# Patient Record
Sex: FEMALE | Race: WHITE | Employment: UNEMPLOYED | URBAN - METROPOLITAN AREA
[De-identification: names, ages, dates, MRNs, and addresses within clinical notes are randomized per-mention and may not be internally consistent; named-entity substitution may affect disease eponyms.]

---

## 2017-04-30 ENCOUNTER — HOSPITAL ENCOUNTER (INPATIENT)
Age: 27
LOS: 1 days | Discharge: HOME | DRG: 880 | End: 2017-05-01
Attending: EMERGENCY MEDICINE | Admitting: INTERNAL MEDICINE
Payer: SELF-PAY

## 2017-04-30 ENCOUNTER — APPOINTMENT (OUTPATIENT)
Dept: GENERAL RADIOLOGY | Age: 27
DRG: 880 | End: 2017-04-30
Attending: EMERGENCY MEDICINE
Payer: SELF-PAY

## 2017-04-30 ENCOUNTER — APPOINTMENT (OUTPATIENT)
Dept: CT IMAGING | Age: 27
DRG: 880 | End: 2017-04-30
Attending: EMERGENCY MEDICINE
Payer: SELF-PAY

## 2017-04-30 DIAGNOSIS — G45.9 TRANSIENT CEREBRAL ISCHEMIA, UNSPECIFIED TYPE: Primary | ICD-10-CM

## 2017-04-30 DIAGNOSIS — F41.0 ANXIETY ATTACK: ICD-10-CM

## 2017-04-30 DIAGNOSIS — R51.9 HEADACHE, UNSPECIFIED HEADACHE TYPE: ICD-10-CM

## 2017-04-30 PROBLEM — E87.6 HYPOKALEMIA: Status: ACTIVE | Noted: 2017-04-30

## 2017-04-30 PROBLEM — I63.9 CVA (CEREBRAL VASCULAR ACCIDENT) (HCC): Status: ACTIVE | Noted: 2017-04-30

## 2017-04-30 PROBLEM — D75.1 ERYTHROCYTOSIS: Status: ACTIVE | Noted: 2017-04-30

## 2017-04-30 PROBLEM — E66.9 OBESITY: Status: ACTIVE | Noted: 2017-04-30

## 2017-04-30 LAB
ALBUMIN SERPL BCP-MCNC: 4.2 G/DL (ref 3.5–5)
ALBUMIN/GLOB SERPL: 1.2 {RATIO} (ref 1.2–3.5)
ALP SERPL-CCNC: 72 U/L (ref 50–136)
ALT SERPL-CCNC: 30 U/L (ref 12–65)
ANION GAP BLD CALC-SCNC: 12 MMOL/L (ref 7–16)
AST SERPL W P-5'-P-CCNC: 13 U/L (ref 15–37)
BASOPHILS # BLD AUTO: 0.1 K/UL (ref 0–0.2)
BASOPHILS # BLD: 1 % (ref 0–2)
BILIRUB SERPL-MCNC: 0.6 MG/DL (ref 0.2–1.1)
BUN SERPL-MCNC: 15 MG/DL (ref 6–23)
CALCIUM SERPL-MCNC: 8.8 MG/DL (ref 8.3–10.4)
CHLORIDE SERPL-SCNC: 106 MMOL/L (ref 98–107)
CO2 SERPL-SCNC: 26 MMOL/L (ref 21–32)
CREAT SERPL-MCNC: 0.96 MG/DL (ref 0.6–1)
DIFFERENTIAL METHOD BLD: ABNORMAL
EOSINOPHIL # BLD: 0.3 K/UL (ref 0–0.8)
EOSINOPHIL NFR BLD: 2 % (ref 0.5–7.8)
ERYTHROCYTE [DISTWIDTH] IN BLOOD BY AUTOMATED COUNT: 13.2 % (ref 11.9–14.6)
GLOBULIN SER CALC-MCNC: 3.6 G/DL (ref 2.3–3.5)
GLUCOSE SERPL-MCNC: 130 MG/DL (ref 65–100)
HCT VFR BLD AUTO: 46.7 % (ref 35.8–46.3)
HGB BLD-MCNC: 16.4 G/DL (ref 11.7–15.4)
IMM GRANULOCYTES # BLD: 0 K/UL (ref 0–0.5)
IMM GRANULOCYTES NFR BLD AUTO: 0.3 % (ref 0–5)
LYMPHOCYTES # BLD AUTO: 35 % (ref 13–44)
LYMPHOCYTES # BLD: 4.2 K/UL (ref 0.5–4.6)
MCH RBC QN AUTO: 28.8 PG (ref 26.1–32.9)
MCHC RBC AUTO-ENTMCNC: 35.1 G/DL (ref 31.4–35)
MCV RBC AUTO: 81.9 FL (ref 79.6–97.8)
MONOCYTES # BLD: 0.6 K/UL (ref 0.1–1.3)
MONOCYTES NFR BLD AUTO: 5 % (ref 4–12)
NEUTS SEG # BLD: 6.8 K/UL (ref 1.7–8.2)
NEUTS SEG NFR BLD AUTO: 57 % (ref 43–78)
PLATELET # BLD AUTO: 410 K/UL (ref 150–450)
PMV BLD AUTO: 10.1 FL (ref 10.8–14.1)
POTASSIUM SERPL-SCNC: 3.1 MMOL/L (ref 3.5–5.1)
PROT SERPL-MCNC: 7.8 G/DL (ref 6.3–8.2)
RBC # BLD AUTO: 5.7 M/UL (ref 4.05–5.25)
SODIUM SERPL-SCNC: 144 MMOL/L (ref 136–145)
TROPONIN I SERPL-MCNC: <0.02 NG/ML (ref 0.02–0.05)
WBC # BLD AUTO: 12 K/UL (ref 4.3–11.1)

## 2017-04-30 PROCEDURE — 84484 ASSAY OF TROPONIN QUANT: CPT | Performed by: EMERGENCY MEDICINE

## 2017-04-30 PROCEDURE — 82668 ASSAY OF ERYTHROPOIETIN: CPT | Performed by: HOSPITALIST

## 2017-04-30 PROCEDURE — 85025 COMPLETE CBC W/AUTO DIFF WBC: CPT | Performed by: EMERGENCY MEDICINE

## 2017-04-30 PROCEDURE — 71020 XR CHEST PA LAT: CPT

## 2017-04-30 PROCEDURE — 83735 ASSAY OF MAGNESIUM: CPT | Performed by: HOSPITALIST

## 2017-04-30 PROCEDURE — 83036 HEMOGLOBIN GLYCOSYLATED A1C: CPT | Performed by: HOSPITALIST

## 2017-04-30 PROCEDURE — 65660000000 HC RM CCU STEPDOWN

## 2017-04-30 PROCEDURE — 80061 LIPID PANEL: CPT | Performed by: HOSPITALIST

## 2017-04-30 PROCEDURE — 83615 LACTATE (LD) (LDH) ENZYME: CPT | Performed by: HOSPITALIST

## 2017-04-30 PROCEDURE — 99285 EMERGENCY DEPT VISIT HI MDM: CPT | Performed by: EMERGENCY MEDICINE

## 2017-04-30 PROCEDURE — 84443 ASSAY THYROID STIM HORMONE: CPT | Performed by: HOSPITALIST

## 2017-04-30 PROCEDURE — 96374 THER/PROPH/DIAG INJ IV PUSH: CPT | Performed by: EMERGENCY MEDICINE

## 2017-04-30 PROCEDURE — 80053 COMPREHEN METABOLIC PANEL: CPT | Performed by: EMERGENCY MEDICINE

## 2017-04-30 PROCEDURE — 74011250636 HC RX REV CODE- 250/636: Performed by: EMERGENCY MEDICINE

## 2017-04-30 PROCEDURE — 70450 CT HEAD/BRAIN W/O DYE: CPT

## 2017-04-30 PROCEDURE — 96375 TX/PRO/DX INJ NEW DRUG ADDON: CPT | Performed by: EMERGENCY MEDICINE

## 2017-04-30 PROCEDURE — 93005 ELECTROCARDIOGRAM TRACING: CPT | Performed by: EMERGENCY MEDICINE

## 2017-04-30 RX ORDER — SODIUM CHLORIDE 0.9 % (FLUSH) 0.9 %
5-10 SYRINGE (ML) INJECTION EVERY 8 HOURS
Status: DISCONTINUED | OUTPATIENT
Start: 2017-04-30 | End: 2017-05-01 | Stop reason: HOSPADM

## 2017-04-30 RX ORDER — METOCLOPRAMIDE HYDROCHLORIDE 5 MG/ML
10 INJECTION INTRAMUSCULAR; INTRAVENOUS
Status: COMPLETED | OUTPATIENT
Start: 2017-04-30 | End: 2017-04-30

## 2017-04-30 RX ORDER — DIPHENHYDRAMINE HYDROCHLORIDE 50 MG/ML
50 INJECTION, SOLUTION INTRAMUSCULAR; INTRAVENOUS
Status: COMPLETED | OUTPATIENT
Start: 2017-04-30 | End: 2017-04-30

## 2017-04-30 RX ORDER — SODIUM CHLORIDE 0.9 % (FLUSH) 0.9 %
5-10 SYRINGE (ML) INJECTION AS NEEDED
Status: DISCONTINUED | OUTPATIENT
Start: 2017-04-30 | End: 2017-05-01 | Stop reason: HOSPADM

## 2017-04-30 RX ORDER — LORAZEPAM 2 MG/ML
1 INJECTION INTRAMUSCULAR
Status: COMPLETED | OUTPATIENT
Start: 2017-04-30 | End: 2017-04-30

## 2017-04-30 RX ORDER — KETOROLAC TROMETHAMINE 30 MG/ML
30 INJECTION, SOLUTION INTRAMUSCULAR; INTRAVENOUS
Status: COMPLETED | OUTPATIENT
Start: 2017-04-30 | End: 2017-04-30

## 2017-04-30 RX ADMIN — KETOROLAC TROMETHAMINE 30 MG: 30 INJECTION, SOLUTION INTRAMUSCULAR at 23:35

## 2017-04-30 RX ADMIN — DIPHENHYDRAMINE HYDROCHLORIDE 50 MG: 50 INJECTION, SOLUTION INTRAMUSCULAR; INTRAVENOUS at 23:35

## 2017-04-30 RX ADMIN — METOCLOPRAMIDE 10 MG: 5 INJECTION, SOLUTION INTRAMUSCULAR; INTRAVENOUS at 23:35

## 2017-04-30 RX ADMIN — LORAZEPAM 1 MG: 2 INJECTION, SOLUTION INTRAMUSCULAR; INTRAVENOUS at 22:00

## 2017-04-30 NOTE — IP AVS SNAPSHOT
Summary of Care Report The Summary of Care report has been created to help improve care coordination. Users with access to [facility] or [facility] (Web-based application) may access additional patient information including the Discharge Summary. If you are not currently a [address] [facility] user and need more information, please call the number listed below in the [place] [address] section and ask to be connected with Medical Records. Facility Information Name Address Phone [address] [address] [address] [phone] Patient Information Patient Name Sex  Kellie Cummins ([id number]) Female 1990 Discharge Information Admitting Provider Service Area Unit Jian Paulino MD / [address] 7 Med Surg / [phone] Discharge Provider Discharge Date/Time Discharge Disposition Destination Jian Paulino MD / [phone] 2017 (Pending) AHR (none) Patient Language Language ENGLISH [13] Hospital Problems as of 2017  Reviewed: 3/15/2016  9:52 AM by Benny Hughes CRNA Class Noted - Resolved Last Modified POA Active Problems * (Principal)TIA (transient ischemic attack)  2017 - Present 2017 by Gonzalez Marroquin MD Yes Entered by Gonzalez Marroquin MD  
  Anxiety attack  2017 - Present 2017 by Gonzalez Marroquin MD Yes Entered by Gonzalez Marroquin MD  
  Hypokalemia  2017 - Present 2017 by Gonzalez Marroquin MD Yes Entered by Gonzalez Marroquin MD  
  Obesity  2017 - Present 2017 by Gonzalez Marroquin MD Yes Entered by Gonzalez Marroquin MD  
  Erythrocytosis  2017 - Present 2017 by Gonzalez Marroquin MD Yes Entered by Gonzalez Marroquin MD  
  Headache  2017 - Present 2017 by Gonzalez Marroquin MD Yes Entered by Chavo Hilton MD  
  Acute non-recurrent maxillary sinusitis  2017 - Present 2017 by Michelle Mendieta. Nilesh Ag MD Yes Entered by Karly Ag MD  
  Overview Signed 2017  5:52 PM by Michelle Mendieta. Nilesh Ag MD  
   Seen on MRI brain. Left ventricular systolic dysfunction without heart failure  2017 - Present 2017 by Michelle Mendieta. Nilesh Ag MD Yes Entered by Karly Ag MD  
  
Non-Hospital Problems as of 2017  Reviewed: 3/15/2016  9:52 AM by Mary Castro CRNA Class Noted - Resolved Last Modified Active Problems H/O  section  3/15/2016 - Present 3/15/2016 by Joseph Sanchez MD  
  Entered by Joseph Sanchez MD  
  Status post repeat low transverse  section  3/16/2016 - Present 3/16/2016 by Nilam Rivera MD  
  Entered by Nilam Rivera MD  
  
You are allergic to the following Allergen Reactions Biaxin (Clarithromycin) Hives Current Discharge Medication List  
  
START taking these medications Dose & Instructions Dispensing Information Comments  
 amoxicillin 875 mg tablet Commonly known as:  AMOXIL Dose:  875 mg Take 1 Tab by mouth two (2) times a day for 10 days. Indications: ACUTE BACTERIAL SINUSITIS Quantity:  20 Tab Refills:  0  
   
 metoprolol succinate 25 mg XL tablet Commonly known as:  TOPROL-XL Dose:  25 mg Take 1 Tab by mouth daily. Indications: LV dysfunction Quantity:  30 Tab Refills:  1 CONTINUE these medications which have NOT CHANGED Dose & Instructions Dispensing Information Comments  
 ibuprofen 800 mg tablet Commonly known as:  MOTRIN Dose:  800 mg Take 1 Tab by mouth every eight (8) hours as needed. Quantity:  30 Tab Refills:  0  
   
 oxyCODONE-acetaminophen 7.5-325 mg per tablet Commonly known as:  PERCOCET 7.5 Dose:  2 Tab Take 2 Tabs by mouth every four (4) hours as needed. Max Daily Amount: 12 Tabs. Quantity:  60 Tab Refills:  0 PRENATAL VITAMIN PO Take  by mouth daily. Refills:  0  
   
 ZOLOFT PO Take  by mouth daily. Refills:  0 Current Immunizations Name Date Influenza Vaccine (Quad) PF 3/18/2016 Tdap 3/18/2016 Follow-up Information Follow up With Details Comments Contact Info [facility]  Call in refrence to follow-up  Meghan Estrella [phone] [address] 121 Cardiology Call for a new patient appointment in 7-10 days [address] [place] [address] [address] Rogers [phone] [facility] 
[phone] Discharge Instructions Transient Ischemic Attack: Care Instructions Your Care Instructions A transient ischemic attack (TIA) is when blood flow to a part of your brain is blocked for a short time. A TIA is like a stroke but usually lasts only a few minutes. A TIA does not cause lasting brain damage. Any vision problems, slurred speech, or other symptoms usually go away in 10 to 20 minutes. But they may last for up to 24 hours. TIAs are often warning signs of a stroke. Some people who have a TIA may have a stroke in the future. A stroke can cause symptoms like those of a TIA. But a stroke causes lasting damage to your brain. You can take steps to help prevent a stroke. One thing you can do is get early treatment. If you have other new symptoms, or if your symptoms do not get better, go back to the emergency room or call your doctor right away. Getting treatment right away may prevent long-term brain damage caused by a stroke. The doctor has checked you carefully, but problems can develop later. If you notice any problems or new symptoms, get medical treatment right away. Follow-up care is a key part of your treatment and safety. Be sure to make and go to all appointments, and call your doctor if you are having problems.  It's also a good idea to know your test results and keep a list of the medicines you take. How can you care for yourself at home? Medicines · Be safe with medicines. Take your medicines exactly as prescribed. Call your doctor if you think you are having a problem with your medicine. · If you take a blood thinner, such as aspirin, be sure you get instructions about how to take your medicine safely. Blood thinners can cause serious bleeding problems. · Call your doctor if you are not able to take your medicines for any reason. · Do not take any over-the-counter medicines or herbal products without talking to your doctor first. 
· If you take birth control pills or hormone therapy, talk to your doctor. Ask if these treatments are right for you. Lifestyle changes · Do not smoke. If you need help quitting, talk to your doctor about stop-smoking programs and medicines. · Be active. If your doctor recommends it, get more exercise. Walking is a good choice. Bit by bit, increase the amount you walk every day. Try for at least 30 minutes on most days of the week. You also may want to swim, bike, or do other activities. · Eat heart-healthy foods. These include fruits, vegetables, high-fiber foods, fish, and foods that are low in sodium, saturated fat, and trans fat. · Stay at a healthy weight. Lose weight if you need to. · Limit alcohol to 2 drinks a day for men and 1 drink a day for women. Staying healthy · Manage other health problems such as diabetes, high blood pressure, and high cholesterol. · Get the flu vaccine every year. When should you call for help? Call 911 anytime you think you may need emergency care. For example, call if: 
· You have new or worse symptoms of a stroke. These may include: 
¨ Sudden numbness, tingling, weakness, or loss of movement in your face, arm, or leg, especially on only one side of your body. ¨ Sudden vision changes. ¨ Sudden trouble speaking. ¨ Sudden confusion or trouble understanding simple statements. ¨ Sudden problems with walking or balance. ¨ A sudden, severe headache that is different from past headaches. Call 911 even if these symptoms go away in a few minutes. · You feel like you are having another TIA. Watch closely for changes in your health, and be sure to contact your doctor if you have any problems. Where can you learn more? Go to http://isha-hortensia.info/. Enter ([phone] 0776 9415 in the search box to learn more about \"Transient Ischemic Attack: Care Instructions. \" Current as of: June 4, 2016 Content Version: 11.2 © 2572-3090 [employer]. Care instructions adapted under license by [employer] (which disclaims liability or warranty for this information). If you have questions about a medical condition or this instruction, always ask your healthcare professional. [place] [address] any warranty or liability for your use of this information. DISCHARGE SUMMARY from Nurse The following personal items are in your possession at time of discharge: 
 
Dental Appliances: None Home Medications: None Jewelry: Ring, With patient Clothing: Dress, At bedside, Footwear Other Valuables: Purse, Cell Phone, At bedside PATIENT INSTRUCTIONS: 
 
 
F-face looks uneven A-arms unable to move or move unevenly S-speech slurred or non-existent T-time-call 911 as soon as signs and symptoms begin-DO NOT go Back to bed or wait to see if you get better-TIME IS BRAIN. Warning Signs of HEART ATTACK Call 911 if you have these symptoms: 
? Chest discomfort. Most heart attacks involve discomfort in the center of the chest that lasts more than a few minutes, or that goes away and comes back. It can feel like uncomfortable pressure, squeezing, fullness, or pain. ? Discomfort in other areas of the upper body. Symptoms can include pain or discomfort in one or both arms, the back, neck, jaw, or stomach. ? Shortness of breath with or without chest discomfort. ? Other signs may include breaking out in a cold sweat, nausea, or lightheadedness. Don't wait more than five minutes to call [address]! Fast action can save your life. Calling 911 is almost always the fastest way to get lifesaving treatment. Emergency Medical Services staff can begin treatment when they arrive  up to an hour sooner than if someone gets to the hospital by car. The discharge information has been reviewed with the patient. The patient verbalized understanding. Discharge medications reviewed with the patient and appropriate educational materials and side effects teaching were provided. Chart Review Routing History No Routing History on File

## 2017-04-30 NOTE — ED TRIAGE NOTES
While cooking pt went to friend stating she could not feel her arms. Arrived tearful and \"unable to talk\". Able to answer questions before friend arrived. Pt able to type on personal cell phone.

## 2017-04-30 NOTE — Clinical Note
Patient Class[de-identified] Observation [171] Type of Bed: Telemetry Remote [29] Reason for Observation: TIA Admitting Physician: Sheree Sparrow [[id number]] Attending Physician: Sheree Sparrow [[id number]]  Diagnosis: TIA

## 2017-04-30 NOTE — IP AVS SNAPSHOT
Rayna Raymundo 
 
 
 [address] [facility] [address] [facility] 
[phone] Patient: Giovanni Ng MRN: [id number] BFI:[birth year] You are allergic to the following Allergen Reactions Biaxin (Clarithromycin) Hives Recent Documentation Height Weight Breastfeeding? BMI OB Status Smoking Status 1.702 m 136.1 kg No 46.99 kg/m2 Recent pregnancy Never Smoker Unresulted Labs Order Current Status ERYTHROPOIETIN In process Emergency Contacts Name Discharge Info Relation Home Work Mobile Randa Cordero  Spouse [3] [phone] About your hospitalization You were admitted on:  April 30, 2017 You last received care in the:  [facility] 7 MED SURG You were discharged on:  May 1, 2017 Unit phone number:  [phone] Why you were hospitalized Your primary diagnosis was:  Tia (Transient Ischemic Attack) Your diagnoses also included:  Anxiety Attack, Hypokalemia, Obesity, Erythrocytosis, Headache, Cva (Cerebral Vascular Accident) (Hcc), Acute Non-Recurrent Maxillary Sinusitis, Left Ventricular Systolic Dysfunction Without Heart Failure Providers Seen During Your Hospitalizations Provider Role Specialty Primary office phone Harvey Rothman MD Attending Provider Emergency Medicine [phone] Mariano Lr MD Attending Provider Internal Medicine [phone] Your Primary Care Physician (PCP) Primary Care Physician Office Phone Office Fax UNKNOWN, PROVIDER ** None ** ** None ** Follow-up Information Follow up With Details Comments Contact Info [address] [place] [address]  Call in refrence to follow-up  [place] [address] [phone] [facility] Cardiology Call for a new patient appointment in 7-10 days [address] [phone] [facility] 
[phone] Current Discharge Medication List  
  
 START taking these medications Dose & Instructions Dispensing Information Comments Morning Noon Evening Bedtime  
 amoxicillin 875 mg tablet Commonly known as:  AMOXIL Your next dose is:  5/2/2017 Dose:  875 mg Take 1 Tab by mouth two (2) times a day for 10 days. Indications: ACUTE BACTERIAL SINUSITIS Quantity:  20 Tab Refills:  0  
     
  
   
   
  
   
  
 metoprolol succinate 25 mg XL tablet Commonly known as:  TOPROL-XL Your last dose was:  5/1/2017 Your next dose is:  5/2/2017 Dose:  25 mg Take 1 Tab by mouth daily. Indications: LV dysfunction Quantity:  30 Tab Refills:  1 CONTINUE these medications which have NOT CHANGED Dose & Instructions Dispensing Information Comments Morning Noon Evening Bedtime  
 ibuprofen 800 mg tablet Commonly known as:  MOTRIN Your next dose is: If needed Dose:  800 mg Take 1 Tab by mouth every eight (8) hours as needed. Quantity:  30 Tab Refills:  0  
     
   
   
   
  
 oxyCODONE-acetaminophen 7.5-325 mg per tablet Commonly known as:  PERCOCET 7.5 Your next dose is: If needed for pain Dose:  2 Tab Take 2 Tabs by mouth every four (4) hours as needed. Max Daily Amount: 12 Tabs. Quantity:  60 Tab Refills:  0 PRENATAL VITAMIN PO Your next dose is:  5/2/2017 Take  by mouth daily. Refills:  0  
     
  
   
   
   
  
 ZOLOFT PO Your next dose is:  5/2/2017 Take  by mouth daily. Refills:  0 Where to Get Your Medications Information on where to get these meds will be given to you by the nurse or doctor. ! Ask your nurse or doctor about these medications  
  amoxicillin 875 mg tablet  
 metoprolol succinate 25 mg XL tablet Discharge Instructions Transient Ischemic Attack: Care Instructions Your Care Instructions A transient ischemic attack (TIA) is when blood flow to a part of your brain is blocked for a short time. A TIA is like a stroke but usually lasts only a few minutes. A TIA does not cause lasting brain damage. Any vision problems, slurred speech, or other symptoms usually go away in 10 to 20 minutes. But they may last for up to 24 hours. TIAs are often warning signs of a stroke. Some people who have a TIA may have a stroke in the future. A stroke can cause symptoms like those of a TIA. But a stroke causes lasting damage to your brain. You can take steps to help prevent a stroke. One thing you can do is get early treatment. If you have other new symptoms, or if your symptoms do not get better, go back to the emergency room or call your doctor right away. Getting treatment right away may prevent long-term brain damage caused by a stroke. The doctor has checked you carefully, but problems can develop later. If you notice any problems or new symptoms, get medical treatment right away. Follow-up care is a key part of your treatment and safety. Be sure to make and go to all appointments, and call your doctor if you are having problems. It's also a good idea to know your test results and keep a list of the medicines you take. How can you care for yourself at home? Medicines · Be safe with medicines. Take your medicines exactly as prescribed. Call your doctor if you think you are having a problem with your medicine. · If you take a blood thinner, such as aspirin, be sure you get instructions about how to take your medicine safely. Blood thinners can cause serious bleeding problems. · Call your doctor if you are not able to take your medicines for any reason. · Do not take any over-the-counter medicines or herbal products without talking to your doctor first. 
· If you take birth control pills or hormone therapy, talk to your doctor. Ask if these treatments are right for you. Lifestyle changes · Do not smoke. If you need help quitting, talk to your doctor about stop-smoking programs and medicines. · Be active. If your doctor recommends it, get more exercise. Walking is a good choice. Bit by bit, increase the amount you walk every day. Try for at least 30 minutes on most days of the week. You also may want to swim, bike, or do other activities. · Eat heart-healthy foods. These include fruits, vegetables, high-fiber foods, fish, and foods that are low in sodium, saturated fat, and trans fat. · Stay at a healthy weight. Lose weight if you need to. · Limit alcohol to 2 drinks a day for men and 1 drink a day for women. Staying healthy · Manage other health problems such as diabetes, high blood pressure, and high cholesterol. · Get the flu vaccine every year. When should you call for help? Call 911 anytime you think you may need emergency care. For example, call if: 
· You have new or worse symptoms of a stroke. These may include: 
¨ Sudden numbness, tingling, weakness, or loss of movement in your face, arm, or leg, especially on only one side of your body. ¨ Sudden vision changes. ¨ Sudden trouble speaking. ¨ Sudden confusion or trouble understanding simple statements. ¨ Sudden problems with walking or balance. ¨ A sudden, severe headache that is different from past headaches. Call 911 even if these symptoms go away in a few minutes. · You feel like you are having another TIA. Watch closely for changes in your health, and be sure to contact your doctor if you have any problems. Where can you learn more? Go to http://isha-hortensia.info/. Enter ([phone] 4333 1405 in the search box to learn more about \"Transient Ischemic Attack: Care Instructions. \" Current as of: June 4, 2016 Content Version: 11.2 © 2326-6881 [employer].  Care instructions adapted under license by [employer] (which disclaims liability or warranty for this information). If you have questions about a medical condition or this instruction, always ask your healthcare professional. [address] any warranty or liability for your use of this information. DISCHARGE SUMMARY from Nurse The following personal items are in your possession at time of discharge: 
 
Dental Appliances: None Home Medications: None Jewelry: Ring, With patient Clothing: Dress, At bedside, Footwear Other Valuables: Purse, Cell Phone, At bedside PATIENT INSTRUCTIONS: 
 
 
F-face looks uneven A-arms unable to move or move unevenly S-speech slurred or non-existent T-time-call 911 as soon as signs and symptoms begin-DO NOT go Back to bed or wait to see if you get better-TIME IS BRAIN. Warning Signs of HEART ATTACK Call 911 if you have these symptoms: 
? Chest discomfort. Most heart attacks involve discomfort in the center of the chest that lasts more than a few minutes, or that goes away and comes back. It can feel like uncomfortable pressure, squeezing, fullness, or pain. ? Discomfort in other areas of the upper body. Symptoms can include pain or discomfort in one or both arms, the back, neck, jaw, or stomach. ? Shortness of breath with or without chest discomfort. ? Other signs may include breaking out in a cold sweat, nausea, or lightheadedness. Don't wait more than five minutes to call [address]! Fast action can save your life. Calling 911 is almost always the fastest way to get lifesaving treatment. Emergency Medical Services staff can begin treatment when they arrive  up to an hour sooner than if someone gets to the hospital by car. The discharge information has been reviewed with the patient.   The patient verbalized understanding. Discharge medications reviewed with the patient and appropriate educational materials and side effects teaching were provided. Discharge Orders None [facility] Announcement We are excited to announce that we are making your provider's discharge notes available to you in [facility]. You will see these notes when they are completed and signed by the physician that discharged you from your recent hospital stay. If you have any questions or concerns about any information you see in [facility], please call the Health Information Department where you were seen or reach out to your Primary Care Provider for more information about your plan of care. Introducing [facility] & [facility] SERVICES! [employer] introduces [facility] [facility] patient portal. Now you can access parts of your medical record, email your doctor's office, and request medication refills online. 1. In your internet browser, go to [email or web address] 2. Click on the First Time User? Click Here link in the Sign In box. You will see the New Member Sign Up page. 3. Enter your [facility] Access Code exactly as it appears below. You will not need to use this code after youve completed the sign-up process. If you do not sign up before the expiration date, you must request a new code. · [facility] Access Code: [email or web address] Expires: 7/29/2017  9:31 PM 
 
4. Enter the last four digits of your Social Security Number (xxxx) and Date of Birth (mm/dd/yyyy) as indicated and click Submit. You will be taken to the next sign-up page. 5. Create a [facility] ID. This will be your [facility] login ID and cannot be changed, so think of one that is secure and easy to remember. 6. Create a [facility] password. You can change your password at any time. 7. Enter your Password Reset Question and Answer. This can be used at a later time if you forget your password. 8. Enter your e-mail address. You will receive e-mail notification when new information is available in [address] 9. Click Sign Up. You can now view and download portions of your medical record. 10. Click the Download Summary menu link to download a portable copy of your medical information. If you have questions, please visit the Frequently Asked Questions section of the lifeIO website. Remember, lifeIO is NOT to be used for urgent needs. For medical emergencies, dial 911. Now available from your iPhone and Android! General Information Please provide this summary of care documentation to your next provider. Patient Signature:  ____________________________________________________________ Date:  ____________________________________________________________  
  
[facility] Signature:  ____________________________________________________________ Date:  ____________________________________________________________

## 2017-05-01 ENCOUNTER — APPOINTMENT (OUTPATIENT)
Dept: CT IMAGING | Age: 27
DRG: 880 | End: 2017-05-01
Attending: HOSPITALIST
Payer: SELF-PAY

## 2017-05-01 ENCOUNTER — APPOINTMENT (OUTPATIENT)
Dept: MRI IMAGING | Age: 27
DRG: 880 | End: 2017-05-01
Attending: HOSPITALIST
Payer: SELF-PAY

## 2017-05-01 VITALS
HEIGHT: 67 IN | WEIGHT: 293 LBS | OXYGEN SATURATION: 97 % | DIASTOLIC BLOOD PRESSURE: 84 MMHG | TEMPERATURE: 97.9 F | BODY MASS INDEX: 45.99 KG/M2 | SYSTOLIC BLOOD PRESSURE: 135 MMHG | RESPIRATION RATE: 18 BRPM | HEART RATE: 91 BPM

## 2017-05-01 PROBLEM — I63.9 CVA (CEREBRAL VASCULAR ACCIDENT) (HCC): Status: RESOLVED | Noted: 2017-04-30 | Resolved: 2017-05-01

## 2017-05-01 PROBLEM — I51.9 LEFT VENTRICULAR SYSTOLIC DYSFUNCTION WITHOUT HEART FAILURE: Status: ACTIVE | Noted: 2017-05-01

## 2017-05-01 PROBLEM — J01.00 ACUTE NON-RECURRENT MAXILLARY SINUSITIS: Status: ACTIVE | Noted: 2017-05-01

## 2017-05-01 LAB
ATRIAL RATE: 122 BPM
CALCULATED P AXIS, ECG09: 54 DEGREES
CALCULATED R AXIS, ECG10: 42 DEGREES
CALCULATED T AXIS, ECG11: -4 DEGREES
CHOLEST SERPL-MCNC: 256 MG/DL
D DIMER PPP FEU-MCNC: <0.19 UG/ML(FEU)
DIAGNOSIS, 93000: NORMAL
EST. AVERAGE GLUCOSE BLD GHB EST-MCNC: NORMAL MG/DL
HBA1C MFR BLD: 4.9 % (ref 4.8–6)
HCG SERPL QL: NEGATIVE
HDLC SERPL-MCNC: 55 MG/DL (ref 40–60)
HDLC SERPL: 4.7 {RATIO}
LDH SERPL L TO P-CCNC: 387 U/L (ref 100–190)
LDLC SERPL CALC-MCNC: 134.8 MG/DL
LIPID PROFILE,FLP: ABNORMAL
MAGNESIUM SERPL-MCNC: 1.9 MG/DL (ref 1.8–2.4)
P-R INTERVAL, ECG05: 148 MS
Q-T INTERVAL, ECG07: 324 MS
QRS DURATION, ECG06: 92 MS
QTC CALCULATION (BEZET), ECG08: 461 MS
TRIGL SERPL-MCNC: 331 MG/DL (ref 35–150)
TSH SERPL DL<=0.005 MIU/L-ACNC: 0.69 UIU/ML (ref 0.36–3.74)
VENTRICULAR RATE, ECG03: 122 BPM
VLDLC SERPL CALC-MCNC: 66.2 MG/DL (ref 6–23)

## 2017-05-01 PROCEDURE — 74011250636 HC RX REV CODE- 250/636: Performed by: HOSPITALIST

## 2017-05-01 PROCEDURE — 77030032490 HC SLV COMPR SCD KNE COVD -B

## 2017-05-01 PROCEDURE — 97161 PT EVAL LOW COMPLEX 20 MIN: CPT

## 2017-05-01 PROCEDURE — 97165 OT EVAL LOW COMPLEX 30 MIN: CPT

## 2017-05-01 PROCEDURE — 74011000258 HC RX REV CODE- 258: Performed by: INTERNAL MEDICINE

## 2017-05-01 PROCEDURE — 92610 EVALUATE SWALLOWING FUNCTION: CPT

## 2017-05-01 PROCEDURE — 84703 CHORIONIC GONADOTROPIN ASSAY: CPT | Performed by: INTERNAL MEDICINE

## 2017-05-01 PROCEDURE — 74011250637 HC RX REV CODE- 250/637: Performed by: INTERNAL MEDICINE

## 2017-05-01 PROCEDURE — C8929 TTE W OR WO FOL WCON,DOPPLER: HCPCS

## 2017-05-01 PROCEDURE — 74011000250 HC RX REV CODE- 250: Performed by: INTERNAL MEDICINE

## 2017-05-01 PROCEDURE — 74011250636 HC RX REV CODE- 250/636: Performed by: INTERNAL MEDICINE

## 2017-05-01 PROCEDURE — 74011636320 HC RX REV CODE- 636/320: Performed by: INTERNAL MEDICINE

## 2017-05-01 PROCEDURE — 74011250637 HC RX REV CODE- 250/637: Performed by: HOSPITALIST

## 2017-05-01 PROCEDURE — 70496 CT ANGIOGRAPHY HEAD: CPT

## 2017-05-01 PROCEDURE — 70551 MRI BRAIN STEM W/O DYE: CPT

## 2017-05-01 PROCEDURE — 36415 COLL VENOUS BLD VENIPUNCTURE: CPT | Performed by: INTERNAL MEDICINE

## 2017-05-01 PROCEDURE — 85379 FIBRIN DEGRADATION QUANT: CPT | Performed by: HOSPITALIST

## 2017-05-01 RX ORDER — SODIUM CHLORIDE 0.9 % (FLUSH) 0.9 %
10 SYRINGE (ML) INJECTION
Status: COMPLETED | OUTPATIENT
Start: 2017-05-01 | End: 2017-05-01

## 2017-05-01 RX ORDER — ACETAMINOPHEN 325 MG/1
650 TABLET ORAL
Status: DISCONTINUED | OUTPATIENT
Start: 2017-05-01 | End: 2017-05-01 | Stop reason: HOSPADM

## 2017-05-01 RX ORDER — POTASSIUM CHLORIDE 20 MEQ/1
40 TABLET, EXTENDED RELEASE ORAL
Status: COMPLETED | OUTPATIENT
Start: 2017-05-01 | End: 2017-05-01

## 2017-05-01 RX ORDER — FAMOTIDINE 20 MG/1
20 TABLET, FILM COATED ORAL EVERY 12 HOURS
Status: DISCONTINUED | OUTPATIENT
Start: 2017-05-01 | End: 2017-05-01 | Stop reason: HOSPADM

## 2017-05-01 RX ORDER — HYDROCODONE BITARTRATE AND ACETAMINOPHEN 7.5; 325 MG/1; MG/1
1 TABLET ORAL
Status: DISCONTINUED | OUTPATIENT
Start: 2017-05-01 | End: 2017-05-01 | Stop reason: HOSPADM

## 2017-05-01 RX ORDER — ONDANSETRON 2 MG/ML
4 INJECTION INTRAMUSCULAR; INTRAVENOUS
Status: DISCONTINUED | OUTPATIENT
Start: 2017-05-01 | End: 2017-05-01 | Stop reason: HOSPADM

## 2017-05-01 RX ORDER — BISACODYL 5 MG
5 TABLET, DELAYED RELEASE (ENTERIC COATED) ORAL DAILY PRN
Status: DISCONTINUED | OUTPATIENT
Start: 2017-05-01 | End: 2017-05-01 | Stop reason: HOSPADM

## 2017-05-01 RX ORDER — AMOXICILLIN 875 MG/1
875 TABLET, FILM COATED ORAL 2 TIMES DAILY
Qty: 20 TAB | Refills: 0 | Status: SHIPPED | OUTPATIENT
Start: 2017-05-01 | End: 2017-05-11

## 2017-05-01 RX ORDER — SODIUM CHLORIDE 9 MG/ML
125 INJECTION, SOLUTION INTRAVENOUS CONTINUOUS
Status: DISPENSED | OUTPATIENT
Start: 2017-05-01 | End: 2017-05-01

## 2017-05-01 RX ORDER — LORAZEPAM 1 MG/1
1 TABLET ORAL
Status: DISCONTINUED | OUTPATIENT
Start: 2017-05-01 | End: 2017-05-01 | Stop reason: HOSPADM

## 2017-05-01 RX ORDER — SODIUM CHLORIDE 0.9 % (FLUSH) 0.9 %
5-10 SYRINGE (ML) INJECTION AS NEEDED
Status: DISCONTINUED | OUTPATIENT
Start: 2017-05-01 | End: 2017-05-01 | Stop reason: HOSPADM

## 2017-05-01 RX ORDER — METOPROLOL SUCCINATE 25 MG/1
25 TABLET, EXTENDED RELEASE ORAL DAILY
Qty: 30 TAB | Refills: 1 | Status: SHIPPED | OUTPATIENT
Start: 2017-05-01 | End: 2022-04-28 | Stop reason: ALTCHOICE

## 2017-05-01 RX ORDER — ENOXAPARIN SODIUM 100 MG/ML
40 INJECTION SUBCUTANEOUS EVERY 24 HOURS
Status: DISCONTINUED | OUTPATIENT
Start: 2017-05-01 | End: 2017-05-01 | Stop reason: DRUGHIGH

## 2017-05-01 RX ORDER — ENOXAPARIN SODIUM 100 MG/ML
40 INJECTION SUBCUTANEOUS EVERY 12 HOURS
Status: DISCONTINUED | OUTPATIENT
Start: 2017-05-01 | End: 2017-05-01 | Stop reason: HOSPADM

## 2017-05-01 RX ORDER — SODIUM CHLORIDE 0.9 % (FLUSH) 0.9 %
5-10 SYRINGE (ML) INJECTION EVERY 8 HOURS
Status: DISCONTINUED | OUTPATIENT
Start: 2017-05-01 | End: 2017-05-01 | Stop reason: HOSPADM

## 2017-05-01 RX ADMIN — HYDROCODONE BITARTRATE AND ACETAMINOPHEN 1 TABLET: 7.5; 325 TABLET ORAL at 10:12

## 2017-05-01 RX ADMIN — ENOXAPARIN SODIUM 40 MG: 40 INJECTION SUBCUTANEOUS at 02:15

## 2017-05-01 RX ADMIN — ENOXAPARIN SODIUM 40 MG: 40 INJECTION SUBCUTANEOUS at 15:50

## 2017-05-01 RX ADMIN — SODIUM CHLORIDE 100 ML: 900 INJECTION, SOLUTION INTRAVENOUS at 14:08

## 2017-05-01 RX ADMIN — SODIUM CHLORIDE 125 ML/HR: 900 INJECTION, SOLUTION INTRAVENOUS at 00:58

## 2017-05-01 RX ADMIN — SODIUM CHLORIDE 125 ML/HR: 900 INJECTION, SOLUTION INTRAVENOUS at 10:16

## 2017-05-01 RX ADMIN — FAMOTIDINE 20 MG: 20 TABLET ORAL at 08:28

## 2017-05-01 RX ADMIN — ACETAMINOPHEN 650 MG: 325 TABLET ORAL at 08:28

## 2017-05-01 RX ADMIN — IOPAMIDOL 80 ML: 755 INJECTION, SOLUTION INTRAVENOUS at 14:08

## 2017-05-01 RX ADMIN — POTASSIUM CHLORIDE 40 MEQ: 20 TABLET, EXTENDED RELEASE ORAL at 02:15

## 2017-05-01 RX ADMIN — Medication 10 ML: at 14:08

## 2017-05-01 RX ADMIN — FAMOTIDINE 20 MG: 20 TABLET ORAL at 02:15

## 2017-05-01 RX ADMIN — PERFLUTREN 1 ML: 6.52 INJECTION, SUSPENSION INTRAVENOUS at 11:01

## 2017-05-01 RX ADMIN — ACETAMINOPHEN 650 MG: 325 TABLET ORAL at 15:52

## 2017-05-01 RX ADMIN — HYDROCODONE BITARTRATE AND ACETAMINOPHEN 1 TABLET: 7.5; 325 TABLET ORAL at 18:21

## 2017-05-01 RX ADMIN — Medication 10 ML: at 00:58

## 2017-05-01 NOTE — PROGRESS NOTES
Problem: Self Care Deficits Care Plan (Adult)  Goal: *Acute Goals and Plan of Care (Insert Text)  1. Patient will complete lower body bathing and dressing with setup and adaptive equipment as needed. 2. Patient will complete toileting with supervision. 3. Patient will tolerate 20 minutes of OT treatment with no rest breaks to increase activity tolerance for ADLs. 4. Patient will attempt standing in preparation for functional transfers. Timeframe: 7 visits       OCCUPATIONAL THERAPY: Initial Assessment 5/1/2017  INPATIENT: Hospital Day: 2  Pay[place] / Plan[place] / Product Type: Medicaid /      NAME/AGE/GENDER: Elizabeth Barakat is a 32 y.o. female            PRIMARY DIAGNOSIS:  TIA  CVA (cerebral vascular accident) ([address] TIA (transient ischemic attack) TIA (transient ischemic attack)        ICD-10: Treatment Diagnosis:        · Other lack of cordination (R27.8)   Precautions/Allergies:         Biaxin [clarithromycin]       ASSESSMENT:      Ms. Beatrice Charles is a 32year old female admitted with inability to talk and use her arms. MRI negative for acute CVA. Patient lives with her  and 2 daughters, ages 11 and 3, at baseline. She is typically independent with ADLs and is the caregiver for her children. Patient supine in bed upon arrival, agreeable to OT evaluation. Keeps eyes closed throughout session due to headache 6/10. RN aware. Able to complete bed mobility with modified independence and sit edge of bed unsupported. BUE are [facility] for ROM, strength is 4/5 with decreased R  strength compared to L. Sensation intact to light touch. Coordination appears WFL. Patient unable to stand this date due to inability to open her eyes because of her headache. \"I feel like I've been run over by a truck. \" Patient is likely functioning close to her baseline, but would benefit from 1-2 more sessions to insure independence and assess transfers/ standing balance.        This section established at most recent assessment   PROBLEM LIST (Impairments causing functional limitations):  1. Decreased Strength  2. Decreased ADL/Functional Activities  3. Decreased Transfer Abilities  4. Decreased Ambulation Ability/Technique  5. Increased Pain  6. Decreased Activity Tolerance    INTERVENTIONS PLANNED: (Benefits and precautions of occupational therapy have been discussed with the patient.)  1. Activities of daily living training  2. Adaptive equipment training  3. Neuromuscular re-eduation  4. Theraputic activity  5. Theraputic exercise      TREATMENT PLAN: Frequency/Duration: Follow patient 3x/ week to address above goals. Rehabilitation Potential For Stated Goals: EXCELLENT      RECOMMENDED REHABILITATION/EQUIPMENT: (at time of discharge pending progress): Continue Skilled Therapy. OCCUPATIONAL PROFILE AND HISTORY:   History of Present Injury/Illness (Reason for Referral):  Per H&P, \"29 y/o  female with hx anxiety and panic attacks states she was at her daughter's teachers house when she became hot and couldn't focus her eyes. Went outside then her right arm became numb. Then R side of face then L arm and speech became jumbled. Her mom  of a stroke at age 28 so pt is always worried that she will have one. She does state she was hyperventilating and having a panic attack. She has felt weak and shaky past couple of days. Fluctuating BP. Headache frontal throbbing. On no meds because she lost her insurance 9 months ago. Used to take luvox and xanax for anxiety. No hx DVT or PE. No hx sleep apnea. No thyroid disease. 30 lb weight gain in 1 year. Has had reflux symptoms lately. \"  Past Medical History/Comorbidities:   Ms. Sharon Mahajan  has a past medical history of Erythrocytosis (2017); Essential hypertension; GDM (gestational diabetes mellitus) ( & 2016); and Psychiatric problem.  She also has no past medical history of Abnormal Papanicolaou smear of cervix; Anemia; Asthma; Breast disorder; Chlamydia; Complication of anesthesia; Epilepsy ([address] Genital herpes; Gonorrhea; Heart abnormality; Herpes simplex virus (HSV) infection; Human immunodeficiency virus (HIV) disease ([address] Infertility, female; Kidney disease; Liver disease; Phlebitis and thrombophlebitis; Pituitary disorder ([address] Polycystic disease, ovaries; Postpartum depression; Rhesus isoimmunization affecting pregnancy; Sickle cell disease ([address] Syphilis; Systemic lupus erythematosus ([address] Thyroid activity decreased; or Trauma. Ms. Stanton Vernon  has a past surgical history that includes  delivery only () and wisdom teeth extraction. Social History/Living Environment:   Home Environment: Private residence  # Steps to Enter: 8  One/Two Story Residence: Two story  # of Interior Steps: 16  Living Alone: No  Support Systems: Spouse/Significant Other/Partner  Patient Expects to be Discharged to[de-identified] Private residence  Current DME Used/Available at Home: None  Prior Level of Function/Work/Activity:  Patient lives with her spouse, 2 kids ages 11 & 3. 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 She drives. R hand dominant   Personal Factors:          Sex:  female        Age:  32 y.o.    Number of Personal Factors/Comorbidities that affect the Plan of Care: Brief history (0):  LOW COMPLEXITY   ASSESSMENT OF OCCUPATIONAL PERFORMANCE[de-identified]   Activities of Daily Living:           Basic ADLs (From Assessment) Complex ADLs (From Assessment)   Basic ADL  Feeding: Setup  Oral Facial Hygiene/Grooming: Setup  Bathing: Minimum assistance  Upper Body Dressing: Setup  Lower Body Dressing: Minimum assistance  Toileting: Minimum assistance Instrumental ADL  Meal Preparation: Maximum assistance  Homemaking: Maximum assistance  Medication Management: Independent  Financial Management: Independent   Grooming/Bathing/Dressing Activities of Daily Living     Cognitive Retraining  Safety/Judgement: Awareness of environment                       Bed/Mat Mobility  Supine to Sit: Modified independent  Sit to Supine: Modified independent  Sit to Stand:  (not tested)  Scooting: Independent          Most Recent Physical Functioning:   Gross Assessment:  AROM: Within functional limits (BUE)  Strength: Generally decreased, functional (BUE 4/5, R  3/5)  Coordination: Within functional limits (BUE)  Sensation: Intact (BUE)               Posture:     Balance:  Sitting: Intact  Standing:  (not tested) Bed Mobility:  Supine to Sit: Modified independent  Sit to Supine: Modified independent  Scooting: Independent  Wheelchair Mobility:     Transfers:  Sit to Stand:  (not tested)                    Patient Vitals for the past 6 hrs:       BP BP Patient Position SpO2 Pulse   17 1201 117/60 At rest 99 % 91        Mental Status  Neurologic State: Alert  Orientation Level: Oriented X4  Cognition: Appropriate decision making  Perception: Appears intact  Perseveration: No perseveration noted  Safety/Judgement: Awareness of environment                               Physical Skills Involved:  1. Balance  2. Mobility  3. Strength Cognitive Skills Affected (resulting in the inability to perform in a timely and safe manner):  1. None Psychosocial Skills Affected:  1. Routines and Behaviors   Number of elements that affect the Plan of Care: 3-5:  MODERATE COMPLEXITY   CLINICAL DECISION MAKIN [facility] [address] [address] AM-PAC 6 Clicks   Basic Mobility Inpatient Short Form  How much help from another person does the patient currently need. .. Total A Lot A Little None   1. Putting on and taking off regular lower body clothing?   [ ] 1   [X] 2   [ ] 3   [ ] 4   2. Bathing (including washing, rinsing, drying)? [ ] 1   [X] 2   [ ] 3   [ ] 4   3. Toileting, which includes using toilet, bedpan or urinal?   [ ] 1   [ ] 2   [X] 3   [ ] 4   4. Putting on and taking off regular upper body clothing?   [ ] 1   [ ] 2   [ ] 3   [X] 4   5.   Taking care of personal grooming such as brushing teeth? [ ] 1   [ ] 2   [ ] 3   [X] 4   6. Eating meals? [ ] 1   [ ] 2   [ ] 3   [X] 4   © 2007, Trustees of [address] [place] [address] [address] [address], under license to [employer]. All rights reserved    Score:  Initial: 19 Most Recent: X (Date: -- )     Interpretation of Tool:  Represents activities that are increasingly more difficult (i.e. Bed mobility, Transfers, Gait). Score 24 23 22-20 19-15 14-10 9-7 6       Modifier CH CI CJ CK CL CM CN         · Self Care:               - CURRENT STATUS:    CK - 40%-59% impaired, limited or restricted               - GOAL STATUS:           CJ - 20%-39% impaired, limited or restricted               - D/C STATUS:                       ---------------To be determined---------------  Payor: [address] [place] [address] [address] / Plan: [place] MEDICAID [address] / Product Type: Medicaid /       Medical Necessity:     · Patient demonstrates excellent rehab potential due to higher previous functional level. Reason for Services/Other Comments:  · Patient continues to require present interventions due to patient's inability to care for self and children at baseline level of functio. Use of outcome tool(s) and clinical judgement create a POC that gives a: MODERATE COMPLEXITY             TREATMENT:   (In addition to Assessment/Re-Assessment sessions the following treatments were rendered)      Pre-treatment Symptoms/Complaints:  Headache, sensitivity to light, keeps eyes closed entire session   Pain: Initial:   Pain Intensity 1: 6  Pain Location 1: Head  Pain Intervention(s) 1: Nurse notified  Post Session:  Same. RN aware and getting orders from MD for more pain meds.        Assessment/Reassessment only, no treatment provided today     Braces/Orthotics/Lines/Etc:   · IV  · O2 Device: Room air  Treatment/Session Assessment:    · Response to Treatment:  Unable to fully participate due to inability to open her eyes because of light sensitivity and headache. · Interdisciplinary Collaboration:  · Occupational Therapist  · Registered Nurse  · After treatment position/precautions:  · Supine in bed  · Bed/Chair-wheels locked  · Bed in low position  · Call light within reach  · RN notified  · Compliance with Program/Exercises: Will assess as treatment progresses. · Recommendations/Intent for next treatment session: \"Next visit will focus on advancements to more challenging activities and reduction in assistance provided\".   Total Treatment Duration:  OT Patient Time In/Time Out  Time In: 0944  Time Out: [address] INDY KatR/L

## 2017-05-01 NOTE — PROGRESS NOTES
Problem: Mobility Impaired (Adult and Pediatric)  Goal: *Acute Goals and Plan of Care (Insert Text)  No goals set due to patient functioning independently. PHYSICAL THERAPY: INITIAL ASSESSMENT, DISCHARGE 2017  INPATIENT: Hospital Day: 2  Payor: SELF PAY / Plan: [facility] SELF PAY / Product Type: Self Pay /      NAME/AGE/GENDER: Casey Rich is a 32 y.o. female            PRIMARY DIAGNOSIS: TIA  CVA (cerebral vascular accident) ([facility] TIA (transient ischemic attack) TIA (transient ischemic attack)        ICD-10: Treatment Diagnosis:       · Generalized Muscle Weakness (M62.81)   Precaution/Allergies:  Biaxin [clarithromycin]       ASSESSMENT:      Ms. Oneta Frankel presents supine in bed with multiple family members. She was pleasant and cooperative, relates that she feels better, but still as if a truck ran over her. B LE strength and sensation WNL. Patient modified independent with bed mobility. Stood independently and ambulated 250' in hallway with slow rukhsana but no losses of balance. Able to perform retrogait and negotiate stairs independently. No skilled PT needs identified at this time, will discontinue skilled PT. This section established at most recent assessment     1. RECOMMENDED REHABILITATION/EQUIPMENT: (at time of discharge pending progress): None. HISTORY:   History of Present Injury/Illness (Reason for Referral):  Per MD note, \"29 y/o  female with hx anxiety and panic attacks states she was at her daughter's teachers house when she became hot and couldn't focus her eyes. Went outside then her right arm became numb. Then R side of face then L arm and speech became jumbled. Her mom  of a stroke at age 28 so pt is always worried that she will have one. She does state she was hyperventilating and having a panic attack. She has felt weak and shaky past couple of days. Fluctuating BP. Headache frontal throbbing.  On no meds because she lost her insurance 9 months ago. Used to take luvox and xanax for anxiety. No hx DVT or PE. No hx sleep apnea. No thyroid disease. 30 lb weight gain in 1 year. Has had reflux symptoms lately. \"  Past Medical History/Comorbidities:   Ms. Benjamin Valdovinos  has a past medical history of Erythrocytosis (2017); Essential hypertension; GDM (gestational diabetes mellitus) ( & ); and Psychiatric problem. She also has no past medical history of Abnormal Papanicolaou smear of cervix; Anemia; Asthma; Breast disorder; Chlamydia; Complication of anesthesia; Epilepsy ([address] Genital herpes; Gonorrhea; Heart abnormality; Herpes simplex virus (HSV) infection; Human immunodeficiency virus (HIV) disease ([address] Infertility, female; Kidney disease; Liver disease; Phlebitis and thrombophlebitis; Pituitary disorder ([address] Polycystic disease, ovaries; Postpartum depression; Rhesus isoimmunization affecting pregnancy; Sickle cell disease ([facility] [address] Syphilis; Systemic lupus erythematosus ([facility] [address] Thyroid activity decreased; or Trauma. Ms. Benjamin Valdovinos  has a past surgical history that includes  delivery only () and wisdom teeth extraction. Social History/Living Environment:   Home Environment: Private residence  # Steps to Enter: 8  One/Two Story Residence: Two story  # of Interior Steps: 16  Living Alone: No  Support Systems: Spouse/Significant Other/Partner  Patient Expects to be Discharged to[de-identified] Private residence  Current DME Used/Available at Home: None  Prior Level of Function/Work/Activity:  Independent in home and community. Cares for her 2 young children.       Number of Personal Factors/Comorbidities that affect the Plan of Care: 0: LOW COMPLEXITY   EXAMINATION:   Most Recent Physical Functioning:   Gross Assessment:  AROM: Within functional limits  Strength: Generally decreased, functional  Coordination: Within functional limits  Sensation: Intact               Posture:     Balance:  Sitting: Intact  Standing: Intact Bed Mobility:  Supine to Sit: Modified independent  Sit to Supine: Modified independent  Scooting: Independent  Wheelchair Mobility:     Transfers:  Sit to Stand: Independent  Stand to Sit: Independent  Bed to Chair: Independent  Gait:     Base of Support: Widened  Speed/Zita: Slow  Step Length: Right shortened;Left shortened  Distance (ft): 250 Feet (ft)  Ambulation - Level of Assistance: Independent  Number of Stairs Trained: 2  Stairs - Level of Assistance: Modified independent       Body Structures Involved:  1. None Body Functions Affected:  1. Sensory/Pain Activities and Participation Affected:  1. None   Number of elements that affect the Plan of Care: 1-2: LOW COMPLEXITY   CLINICAL PRESENTATION:   Presentation: Stable and uncomplicated: LOW COMPLEXITY   CLINICAL DECISION MAKIN [facility] [address] AM-PAC 6 Clicks   Basic Mobility Inpatient Short Form  How much difficulty does the patient currently have. .. Unable A Lot A Little None   1. Turning over in bed (including adjusting bedclothes, sheets and blankets)? [ ] 1   [ ] 2   [ ] 3   [X] 4   2. Sitting down on and standing up from a chair with arms ( e.g., wheelchair, bedside commode, etc.)   [ ] 1   [ ] 2   [ ] 3   [X] 4   3. Moving from lying on back to sitting on the side of the bed? [ ] 1   [ ] 2   [ ] 3   [X] 4   How much help from another person does the patient currently need. .. Total A Lot A Little None   4. Moving to and from a bed to a chair (including a wheelchair)? [ ] 1   [ ] 2   [ ] 3   [X] 4   5. Need to walk in hospital room? [ ] 1   [ ] 2   [ ] 3   [X] 4   6. Climbing 3-5 steps with a railing? [ ] 1   [ ] 2   [ ] 3   [X] 4   © 2007, Trustees of [facility] [address] [address], under license to [employer]. All rights reserved    Score:  Initial: 24 Most Recent: X (Date: -- )     Interpretation of Tool:  Represents activities that are increasingly more difficult (i.e. Bed mobility, Transfers, Gait).        Score 24 23 22-20 19-15 14-10 9-7 6       Modifier CH CI CJ CK CL CM CN         · Mobility - Walking and Moving Around:               - CURRENT STATUS:    CH - 0% impaired, limited or restricted               - GOAL STATUS:           CH - 0% impaired, limited or restricted               - D/C STATUS:                       CH - 0% impaired, limited or restricted  Payor: SELF PAY / [facility] / Product Type: Self Pay /        ·    Use of outcome tool(s) and clinical judgement create a POC that gives a: Clear prediction of patient's progress: LOW COMPLEXITY                 TREATMENT:   (In addition to Assessment/Re-Assessment sessions the following treatments were rendered)   Pre-treatment Symptoms/Complaints:    Pain: Initial:   Pain Intensity 1: 6  Pain Location 1: Head  Post Session:  unchanged      Assessment/Reassessment only, no treatment provided today     Braces/Orthotics/Lines/Etc:   · O2 Device: Room air  Treatment/Session Assessment:    · Response to Treatment:  Pleasant and cooperative.   · Interdisciplinary Collaboration:  · Physical Therapist  · Registered Nurse  · After treatment position/precautions:  · Supine in bed  · Bed/Chair-wheels locked  · Bed in low position  · Call light within reach  · Family at bedside  ·   Total Treatment Duration:  PT Patient Time In/Time Out  Time In: 1520  Time Out: [address], PT

## 2017-05-01 NOTE — H&P
Hospitalist H&P/Consult Note     Admit Date:  2017  9:14 PM   Name:  Elen Child   Age:  32 y.o.  :  1990   MRN:  [id number]   PCP:  PROVIDER UNKNOWN  Treatment Team: Attending Provider: Jc Rucker MD; Primary Nurse: Juju Juarez    HPI:    33 y/o  female with hx anxiety and panic attacks states she was at her daughter's teachers house when she became hot and couldn't focus her eyes. Went outside then her right arm became numb. Then R side of face then L arm and speech became jumbled. Her mom  of a stroke at age 28 so pt is always worried that she will have one. She does state she was hyperventilating and having a panic attack. She has felt weak and shaky past couple of days. Fluctuating BP. Headache frontal throbbing. On no meds because she lost her insurance 9 months ago. Used to take luvox and xanax for anxiety. No hx DVT or PE. No hx sleep apnea. No thyroid disease. 30 lb weight gain in 1 year. Has had reflux symptoms lately. 10 systems reviewed and negative except as noted in HPI. Past Medical History:   Diagnosis Date    Erythrocytosis 2017    Essential hypertension     Chronic vs anxiety    GDM (gestational diabetes mellitus) 2012 & 2016    diet-controlled    Psychiatric problem     anxiety, OCD, and depression      Past Surgical History:   Procedure Laterality Date     DELIVERY ONLY      HX WISDOM TEETH EXTRACTION        Prior to Admission Medications   Prescriptions Last Dose Informant Patient Reported? Taking? PRENATAL VIT W-CA,FE,FA,<1 MG, (PRENATAL VITAMIN PO)   Yes No   Sig: Take  by mouth daily. SERTRALINE HCL (ZOLOFT PO)   Yes No   Sig: Take  by mouth daily. ibuprofen (MOTRIN) 800 mg tablet   No No   Sig: Take 1 Tab by mouth every eight (8) hours as needed. oxyCODONE-acetaminophen (PERCOCET 7.5) 7.5-325 mg per tablet   No No   Sig: Take 2 Tabs by mouth every four (4) hours as needed. Max Daily Amount: 12 Tabs. Facility-Administered Medications: None     Allergies   Allergen Reactions    Biaxin [Clarithromycin] Hives      Social History   Substance Use Topics    Smoking status: Never Smoker    Smokeless tobacco: Not on file    Alcohol use No      Family History   Problem Relation Age of Onset    Stroke Mother     Hypertension Father     Cancer Father     Diabetes Maternal Aunt     Bleeding Prob Paternal Aunt     Bleeding Prob Maternal Grandmother     Diabetes Maternal Grandfather     Cancer Paternal Grandfather     Alcohol abuse Neg Hx     Arthritis-osteo Neg Hx     Asthma Neg Hx     Elevated Lipids Neg Hx     Headache Neg Hx     Heart Disease Neg Hx     Lung Disease Neg Hx     Migraines Neg Hx     Psychiatric Disorder Neg Hx     Mental Retardation Neg Hx       Immunization History   Administered Date(s) Administered    Influenza Vaccine (Quad) PF 03/18/2016    Tdap 03/18/2016       Objective:   Patient Vitals for the past 24 hrs:   Temp Pulse Resp BP SpO2   04/30/17 2309 - 87 18 125/60 90 %   04/30/17 2249 - (!) 102 20 142/72 91 %   04/30/17 2229 - 92 18 141/69 96 %   04/30/17 2209 - 95 18 144/79 94 %   04/30/17 2152 - 97 20 152/86 100 %   04/30/17 1918 99.3 °F (37.4 °C) (!) 128 (!) 40 (!) 133/96 98 %     Oxygen Therapy  O2 Sat (%): 90 % (04/30/17 2309)  Pulse via Oximetry: 87 beats per minute (04/30/17 2309)  O2 Device: Room air (04/30/17 2309)  No intake or output data in the 24 hours ending 05/01/17 0001    Physical Exam:  General:    Well nourished. Alert and oriented. Speech clear  Eyes:   Normal sclera. Extraocular movements intact. no nystagmus. PERRLA/EOMI  ENT:  Normocephalic, atraumatic. Moist mucous membranes. No thyroid tenderness  CV:   Regular rate and rhythm. No murmur, rub, or gallop. Lungs:  Clear to auscultation bilaterally. No wheezing, rhonchi, or rales. Abdomen: Soft, nontender, nondistended. Bowel sounds normal. obese  Extremities: Warm and dry.   No cyanosis or edema. Neurologic: CN II-XII intact. Sensation intact. Str 5/5 all extremities. Skin:     No rashes or jaundice. No wounds. Psych:  Normal mood and affect. I reviewed the labs, imaging, EKGs, telemetry, and other studies done this admission. Data Review:   Recent Results (from the past 24 hour(s))   CBC WITH AUTOMATED DIFF    Collection Time: 04/30/17  7:35 PM   Result Value Ref Range    WBC 12.0 (H) 4.3 - 11.1 K/uL    RBC 5.70 (H) 4.05 - 5.25 M/uL    HGB 16.4 (H) 11.7 - 15.4 g/dL    HCT 46.7 (H) 35.8 - 46.3 %    MCV 81.9 79.6 - 97.8 FL    MCH 28.8 26.1 - 32.9 PG    MCHC 35.1 (H) 31.4 - 35.0 g/dL    RDW 13.2 11.9 - 14.6 %    PLATELET 236 893 - 242 K/uL    MPV 10.1 (L) 10.8 - 14.1 FL    DF AUTOMATED      NEUTROPHILS 57 43 - 78 %    LYMPHOCYTES 35 13 - 44 %    MONOCYTES 5 4.0 - 12.0 %    EOSINOPHILS 2 0.5 - 7.8 %    BASOPHILS 1 0.0 - 2.0 %    IMMATURE GRANULOCYTES 0.3 0.0 - 5.0 %    ABS. NEUTROPHILS 6.8 1.7 - 8.2 K/UL    ABS. LYMPHOCYTES 4.2 0.5 - 4.6 K/UL    ABS. MONOCYTES 0.6 0.1 - 1.3 K/UL    ABS. EOSINOPHILS 0.3 0.0 - 0.8 K/UL    ABS. BASOPHILS 0.1 0.0 - 0.2 K/UL    ABS. IMM. GRANS. 0.0 0.0 - 0.5 K/UL   METABOLIC PANEL, COMPREHENSIVE    Collection Time: 04/30/17  7:35 PM   Result Value Ref Range    Sodium 144 136 - 145 mmol/L    Potassium 3.1 (L) 3.5 - 5.1 mmol/L    Chloride 106 98 - 107 mmol/L    CO2 26 21 - 32 mmol/L    Anion gap 12 7 - 16 mmol/L    Glucose 130 (H) 65 - 100 mg/dL    BUN 15 6 - 23 MG/DL    Creatinine 0.96 0.6 - 1.0 MG/DL    GFR est AA >60 >60 ml/min/1.73m2    GFR est non-AA >60 >60 ml/min/1.73m2    Calcium 8.8 8.3 - 10.4 MG/DL    Bilirubin, total 0.6 0.2 - 1.1 MG/DL    ALT (SGPT) 30 12 - 65 U/L    AST (SGOT) 13 (L) 15 - 37 U/L    Alk.  phosphatase 72 50 - 136 U/L    Protein, total 7.8 6.3 - 8.2 g/dL    Albumin 4.2 3.5 - 5.0 g/dL    Globulin 3.6 (H) 2.3 - 3.5 g/dL    A-G Ratio 1.2 1.2 - 3.5     TROPONIN I    Collection Time: 04/30/17  7:35 PM   Result Value Ref Range    Troponin-I, Qt. <0. 02 (L) 0.02 - 0.05 NG/ML       Imaging Studies:  CXR Results  (Last 48 hours)               04/30/17 2020  XR CHEST PA LAT Final result    Impression:  Impression: Unremarkable two-view chest.               Narrative:  History: anxiety/chest pain       Two views chest       Findings: The lungs are well expanded and clear. The cardiac silhouette, and   mediastinal contour, and osseous structures are normal.               CT Results  (Last 48 hours)               04/30/17 2047  CT HEAD WO CONT Final result    Impression:  Impression: Unremarkable CT head without contrast.               Narrative:  History: friend reports pt can not talk. Exam: CT head without contrast       Technique: Thin section axial CT images were obtained from the skullbase through   the vertex. Radiation dose reduction techniques were used for this study. Our   CT scanners use one or all of the following: Automated exposure control,   adjustment of the mA and/or kV according to patient size, use of iterative   reconstruction. Findings: The ventricles are normal in size, shape, and position. No abnormal   parenchymal density is present. No extra-axial fluid collection is present. There is no mass or mass-effect. The basilar cisterns are patent. The paranasal   sinuses and mastoid air cells are clear.                  Assessment and Plan:     Hospital Problems as of 5/1/2017  Date Reviewed: 3/15/2016          Codes Class Noted - Resolved POA    * (Principal)TIA (transient ischemic attack) ICD-10-CM: G45.9  ICD-9-CM: 435.9  4/30/2017 - Present Yes        Anxiety attack ICD-10-CM: F41.0  ICD-9-CM: 300.01  4/30/2017 - Present Yes        Hypokalemia ICD-10-CM: E87.6  ICD-9-CM: 276.8  4/30/2017 - Present Yes        Obesity ICD-10-CM: E66.9  ICD-9-CM: 278.00  4/30/2017 - Present Yes        Erythrocytosis ICD-10-CM: D75.1  ICD-9-CM: 289.0  4/30/2017 - Present Yes        Headache ICD-10-CM: R51  ICD-9-CM: 784.0  4/30/2017 - Present Yes CVA (cerebral vascular accident) [facility]) ICD-10-CM: I63.9  ICD-9-CM: 434.91  2017 - Present Unknown                PLAN  · Admit to inpatient on telemetry (observational status for now)  · Check MRI brain, 2d echo, CTA head and neck, lipids and a1c.  antithrombotic and statin ordered (not given as lactation warning from pharmacy). PT/OT/SLP evals. Permissive HTN for ~24 hours after symptom onset  · Pt had family hx of CVA in her mother who  at age 28. Unknown if due to an aneurysm. CTA could help clarify. · Pt does have an erythrocytosis. No definite family hx of polycythemia. Pt is a nonsmoker. She is not aware of having sleep apnea but weight has increased 30 lbs in 1 year. Possible pickwickian.  Will check LDH level and erythropoietin level  · Replace K, check Mg and TSH  · Check D Dimer      Estimated LOS:  2-3 days  Risk: high    Signed:  Gonzalez Marroquin MD

## 2017-05-01 NOTE — DISCHARGE SUMMARY
Hospitalist Discharge Summary     Patient ID:  Krista Ryan  [id number]  32 y.o.  1990  Admit date: 2017  9:14 PM  Discharge date and time: 2017  Attending: Jian Paulino MD  PCP:  PROVIDER UNKNOWN  Treatment Team: Attending Provider: Jian Paulino MD; Charge Nurse: Raeann Mcneill RN; Utilization Review: Blanka Oneill RN; Care Manager: Mellisa Gan LMSW    Principal Diagnosis TIA (transient ischemic attack)   Principal Problem:    TIA (transient ischemic attack) (2017)    Active Problems:    Anxiety attack (2017)      Hypokalemia (2017)      Obesity (2017)      Erythrocytosis (2017)      Headache (2017)      CVA (cerebral vascular accident) ([facility] [address] (2017)      Acute non-recurrent maxillary sinusitis (2017)      Overview: Seen on MRI brain. HPI:  '29 y/o  female with hx anxiety and panic attacks states she was at her daughter's teachers house when she became hot and couldn't focus her eyes. Went outside then her right arm became numb. Then R side of face then L arm and speech became jumbled. Her mom  of a stroke at age 28 so pt is always worried that she will have one. She does state she was hyperventilating and having a panic attack. She has felt weak and shaky past couple of days. Fluctuating BP. Headache frontal throbbing. On no meds because she lost her insurance 9 months ago. Used to take luvox and xanax for anxiety. No hx DVT or PE. No hx sleep apnea. No thyroid disease. 30 lb weight gain in 1 year. Has had reflux symptoms lately.'    Hospital Course:  She was admitted to remote telemetry. She remained in sinus rhythm. She had negative CT head, CTA neck and brain, and MRI brain. MRI brain did show R maxillary sinusitis, and she did complain of a R sided headache and states she has had some sinus congestion over the last several weeks. Echo showed LVEF at 40-45%.   She denies chest pain or significant chronic shortness of breath. Discussed with cardiologist and recommendation is for outpatient follow up. She will be started on Toprol XL 25 mg daily. Suspect symptoms on admission related to severe panic attack and not a true TIA. Significant Diagnostic Studies:       Labs: Results:       Chemistry Recent Labs      04/30/17 1935   GLU  130*   NA  144   K  3.1*   CL  106   CO2  26   BUN  15   CREA  0.96   CA  8.8   AGAP  12   AP  72   TP  7.8   ALB  4.2   GLOB  3.6*   AGRAT  1.2      CBC w/Diff Recent Labs      04/30/17 1935   WBC  12.0*   RBC  5.70*   HGB  16.4*   HCT  46.7*   PLT  410   GRANS  57   LYMPH  35   EOS  2      Cardiac Enzymes No results for input(s): CPK, CKND1, SUZY in the last 72 hours. No lab exists for component: CKRMB, TROIP   Coagulation No results for input(s): PTP, INR, APTT in the last 72 hours. No lab exists for component: INREXT    Lipid Panel Lab Results   Component Value Date/Time    Cholesterol, total 256 04/30/2017 07:35 PM    HDL Cholesterol 55 04/30/2017 07:35 PM    LDL, calculated 134.8 04/30/2017 07:35 PM    VLDL, calculated 66.2 04/30/2017 07:35 PM    Triglyceride 331 04/30/2017 07:35 PM    CHOL/HDL Ratio 4.7 04/30/2017 07:35 PM      BNP No results for input(s): BNPP in the last 72 hours. Liver Enzymes Recent Labs      04/30/17 1935   TP  7.8   ALB  4.2   AP  72   SGOT  13*      Thyroid Studies Lab Results   Component Value Date/Time    TSH 0.691 04/30/2017 07:35 PM            Discharge Exam:  Visit Vitals    /84 (BP 1 Location: Left arm, BP Patient Position: At rest)    Pulse 91    Temp 97.9 °F (36.6 °C)    Resp 18    Ht 5' 7\" (1.702 m)    Wt 136.1 kg (300 lb)    SpO2 97%    Breastfeeding No    BMI 46.99 kg/m2     General appearance: alert, cooperative, no distress, appears stated age, obese  Lungs: clear to auscultation bilaterally  Heart: regular rate and rhythm, S1, S2 normal, no murmur, click, rub or gallop  Abdomen: soft, non-tender.  Bowel sounds normal. No masses,  no organomegaly  Extremities: no cyanosis or edema  Neurologic: Grossly normal    Disposition: home  Discharge Condition: stable  Patient Instructions:   Current Discharge Medication List      START taking these medications    Details   amoxicillin (AMOXIL) 875 mg tablet Take 1 Tab by mouth two (2) times a day for 10 days. Indications: ACUTE BACTERIAL SINUSITIS  Qty: 20 Tab, Refills: 0      metoprolol succinate (TOPROL-XL) 25 mg XL tablet Take 1 Tab by mouth daily. Indications: LV dysfunction  Qty: 30 Tab, Refills: 1         CONTINUE these medications which have NOT CHANGED    Details   ibuprofen (MOTRIN) 800 mg tablet Take 1 Tab by mouth every eight (8) hours as needed. Qty: 30 Tab, Refills: 0      oxyCODONE-acetaminophen (PERCOCET 7.5) 7.5-325 mg per tablet Take 2 Tabs by mouth every four (4) hours as needed. Max Daily Amount: 12 Tabs. Qty: 60 Tab, Refills: 0      PRENATAL VIT W-CA,FE,FA,<1 MG, (PRENATAL VITAMIN PO) Take  by mouth daily. SERTRALINE HCL (ZOLOFT PO) Take  by mouth daily. Activity: Activity as tolerated  Diet: Regular Diet- 1800 calorie/day      Follow-up  ·   [facility] in 7-10 days to review echocardiogram.  · She was advised to have an outpatient sleep study arranged to rule out sleep apnea. Time spent to discharge patient 28 minutes  Signed:  Margret Ingram.  Saira Rivero MD  5/1/2017  5:55 PM

## 2017-05-01 NOTE — PROGRESS NOTES
TRANSFER - IN REPORT:    Verbal report received from GARO OWENS [facility], RN(name) on Marie Vaughan  being received from ED(unit) for routine progression of care      Report consisted of patients Situation, Background, Assessment and   Recommendations(SBAR). Information from the following report(s) SBAR, Kardex, Intake/Output, MAR and Recent Results was reviewed with the receiving nurse. Opportunity for questions and clarification was provided. Assessment will be completed upon patients arrival to unit and care assumed.

## 2017-05-01 NOTE — PROGRESS NOTES
Problem: Interdisciplinary Rounds  Goal: Interdisciplinary Rounds  Outcome: Progressing Towards Goal  Interdisciplinary team rounds were held 5/1/2017 with the following team members:Care Management, Pastoral Care, Physical Therapy, Physician and . Plan of care discussed. See clinical pathway and/or care plan for interventions and desired outcomes.

## 2017-05-01 NOTE — PROGRESS NOTES
Care Management Interventions  PCP Verified by CM: Yes (pt has no PCP)  Mode of Transport at Discharge: Other (see comment)  Transition of Care Consult (CM Consult): Discharge Planning  Discharge Durable Medical Equipment: No  Physical Therapy Consult: Yes  Occupational Therapy Consult: Yes  Speech Therapy Consult: Yes  Current Support Network: Own Home, Lives with Spouse, Family Lives Nearby  Confirm Follow Up Transport: Self  Plan discussed with Pt/Family/Caregiver: Yes  Discharge Location  Discharge Placement: Home with family assistance    Pt is a 33 yo female admitted due to a possible TIA/CVA. Pt confirmed that her Medicaid is family planning medicaid only and that she lost her health insurance(she was covered under her father's plan) when she turned 32. Pt had a PCP (NP through [facility]) but is unable to see this PCP any longer since she is not on her father's insurance any longer. DANNIE has contacted the RN liaison with the [facility] to determine if the pt would qualify for their services since she does have family planning medicaid. Awaiting a response. DANNIE can provide a voucher for pt's discharge medications (except narcotics or benzos). MD please be aware that pt is uninsured when prescribing discharge medications. PT eval pending but ST/OT evals appear to indicate pt is at her baseline functioning. DANNIE will continue to follow to assist as needed.

## 2017-05-01 NOTE — DISCHARGE INSTRUCTIONS
Transient Ischemic Attack: Care Instructions  Your Care Instructions    A transient ischemic attack (TIA) is when blood flow to a part of your brain is blocked for a short time. A TIA is like a stroke but usually lasts only a few minutes. A TIA does not cause lasting brain damage. Any vision problems, slurred speech, or other symptoms usually go away in 10 to 20 minutes. But they may last for up to 24 hours. TIAs are often warning signs of a stroke. Some people who have a TIA may have a stroke in the future. A stroke can cause symptoms like those of a TIA. But a stroke causes lasting damage to your brain. You can take steps to help prevent a stroke. One thing you can do is get early treatment. If you have other new symptoms, or if your symptoms do not get better, go back to the emergency room or call your doctor right away. Getting treatment right away may prevent long-term brain damage caused by a stroke. The doctor has checked you carefully, but problems can develop later. If you notice any problems or new symptoms, get medical treatment right away. Follow-up care is a key part of your treatment and safety. Be sure to make and go to all appointments, and call your doctor if you are having problems. It's also a good idea to know your test results and keep a list of the medicines you take. How can you care for yourself at home? Medicines  · Be safe with medicines. Take your medicines exactly as prescribed. Call your doctor if you think you are having a problem with your medicine. · If you take a blood thinner, such as aspirin, be sure you get instructions about how to take your medicine safely. Blood thinners can cause serious bleeding problems. · Call your doctor if you are not able to take your medicines for any reason. · Do not take any over-the-counter medicines or herbal products without talking to your doctor first.  · If you take birth control pills or hormone therapy, talk to your doctor.  Ask if these treatments are right for you. Lifestyle changes  · Do not smoke. If you need help quitting, talk to your doctor about stop-smoking programs and medicines. · Be active. If your doctor recommends it, get more exercise. Walking is a good choice. Bit by bit, increase the amount you walk every day. Try for at least 30 minutes on most days of the week. You also may want to swim, bike, or do other activities. · Eat heart-healthy foods. These include fruits, vegetables, high-fiber foods, fish, and foods that are low in sodium, saturated fat, and trans fat. · Stay at a healthy weight. Lose weight if you need to. · Limit alcohol to 2 drinks a day for men and 1 drink a day for women. Staying healthy  · Manage other health problems such as diabetes, high blood pressure, and high cholesterol. · Get the flu vaccine every year. When should you call for help? Call 911 anytime you think you may need emergency care. For example, call if:  · You have new or worse symptoms of a stroke. These may include:  ¨ Sudden numbness, tingling, weakness, or loss of movement in your face, arm, or leg, especially on only one side of your body. ¨ Sudden vision changes. ¨ Sudden trouble speaking. ¨ Sudden confusion or trouble understanding simple statements. ¨ Sudden problems with walking or balance. ¨ A sudden, severe headache that is different from past headaches. Call 911 even if these symptoms go away in a few minutes. · You feel like you are having another TIA. Watch closely for changes in your health, and be sure to contact your doctor if you have any problems. Where can you learn more? Go to http://isha-hortensia.info/. Enter [phone] in the search box to learn more about \"Transient Ischemic Attack: Care Instructions. \"  Current as of: June 4, 2016  Content Version: 11.2  © 9794-5144 [employer].  Care instructions adapted under license by [employer] (which disclaims liability or warranty for this information). If you have questions about a medical condition or this instruction, always ask your healthcare professional. [address] any warranty or liability for your use of this information. DISCHARGE SUMMARY from Nurse    The following personal items are in your possession at time of discharge:    Dental Appliances: None        Home Medications: None  Jewelry: Ring, With patient  Clothing: Dress, At bedside, Footwear  Other Valuables: Purse, Cell Phone, At bedside             PATIENT INSTRUCTIONS:    After general anesthesia or intravenous sedation, for 24 hours or while taking prescription Narcotics:  · Limit your activities  · Do not drive and operate hazardous machinery  · Do not make important personal or business decisions  · Do  not drink alcoholic beverages  · If you have not urinated within 8 hours after discharge, please contact your surgeon on call. Report the following to your surgeon:  · Excessive pain, swelling, redness or odor of or around the surgical area  · Temperature over 100.5  · Nausea and vomiting lasting longer than 4 hours or if unable to take medications  · Any signs of decreased circulation or nerve impairment to extremity: change in color, persistent  numbness, tingling, coldness or increase pain  · Any questions        What to do at Home:  Recommended activity: See surgical instructions, diet as tolerated. *  Please give a list of your current medications to your Primary Care Provider. *  Please update this list whenever your medications are discontinued, doses are      changed, or new medications (including over-the-counter products) are added. *  Please carry medication information at all times in case of emergency situations.           These are general instructions for a healthy lifestyle:    No smoking/ No tobacco products/ Avoid exposure to second hand smoke    Surgeon General's Warning:  Quitting smoking now greatly reduces serious risk to your health. Obesity, smoking, and sedentary lifestyle greatly increases your risk for illness    A healthy diet, regular physical exercise & weight monitoring are important for maintaining a healthy lifestyle    You may be retaining fluid if you have a history of heart failure or if you experience any of the following symptoms:  Weight gain of 3 pounds or more overnight or 5 pounds in a week, increased swelling in our hands or feet or shortness of breath while lying flat in bed. Please call your doctor as soon as you notice any of these symptoms; do not wait until your next office visit. Recognize signs and symptoms of STROKE:    F-face looks uneven    A-arms unable to move or move unevenly    S-speech slurred or non-existent    T-time-call 911 as soon as signs and symptoms begin-DO NOT go       Back to bed or wait to see if you get better-TIME IS BRAIN. Warning Signs of HEART ATTACK     Call 911 if you have these symptoms:   Chest discomfort. Most heart attacks involve discomfort in the center of the chest that lasts more than a few minutes, or that goes away and comes back. It can feel like uncomfortable pressure, squeezing, fullness, or pain.  Discomfort in other areas of the upper body. Symptoms can include pain or discomfort in one or both arms, the back, neck, jaw, or stomach.  Shortness of breath with or without chest discomfort.  Other signs may include breaking out in a cold sweat, nausea, or lightheadedness. Don't wait more than five minutes to call 911 - MINUTES MATTER! Fast action can save your life. Calling 911 is almost always the fastest way to get lifesaving treatment. Emergency Medical Services staff can begin treatment when they arrive -- up to an hour sooner than if someone gets to the hospital by car. The discharge information has been reviewed with the patient. The patient verbalized understanding.     Discharge medications reviewed with the patient and appropriate educational materials and side effects teaching were provided.

## 2017-05-01 NOTE — ED NOTES
Report received from [place], [facility].

## 2017-05-01 NOTE — PROGRESS NOTES
*No swallowing goals  STG: Pt will participate with full cognitive assessment x1 pending MRI results  LTG: Pt will reach highest cognitive functional possible for maximum independence at discharge    Speech language pathology: bedside swallow note: Initial Assessment    NAME/AGE/GENDER: Anuradha Benavidez is a 32 y.o. female  DATE: 5/1/2017  PRIMARY DIAGNOSIS: TIA  CVA (cerebral vascular accident) ([facility] [address]       ICD-10: Treatment Diagnosis: dysphagia; unspecified R13.10  INTERDISCIPLINARY COLLABORATION: Registered Nurse  PRECAUTIONS/ALLERGIES: Biaxin [clarithromycin] ASSESSMENT:Based on the objective data described below, Ms. Misha Sloan presents with a swallow within functional limits. Patient laying in the dark with a damp washcloth on her head. Recently medicated for a headache but agreeable to attempt a few po trials. Reports limited appetite. Patient reports initial word finding deficits at onset of symptoms but reports speech is now at baseline. She is fully oriented and recalls recent events. She lives at home with her  who is currently out of town for a wedding and their two girls ages [de-identified] and one. No overt signs/sx of aspiration with thin liquids via the straw, mixed, or solids. Patient observed with limited trials of the cracker due to answering phone calls, texting, and scrolling through her phone to determine which smoothie she wanted her family to bring her during the session. Recommend continue cardiac diet/thin liquids. Further cognitive-linguistic assessment pending MRI results. Patient will benefit from skilled intervention to address the below impairments. ?????? ? ? This section established at most recent assessment??????????  PROBLEM LIST (Impairments causing functional limitations):  1. Cognition? REHABILITATION POTENTIAL FOR STATED GOALS: Excellent  PLAN OF CARE:   Patient will benefit from skilled intervention to address the following impairments.   RECOMMENDATIONS AND PLANNED INTERVENTIONS (Benefits and precautions of therapy have been discussed with the patient.):  · continue prescribed diet  MEDICATIONS:  · With liquid  COMPENSATORY STRATEGIES/MODIFICATIONS INCLUDING:  · None  OTHER RECOMMENDATIONS (including follow up treatment recommendations): · Family training/education  · Patient education  · cognitive assessment pending MRI results  RECOMMENDED DIET MODIFICATIONS DISCUSSED WITH:  · Nursing  · Patient  FREQUENCY/DURATION: Continue to follow patient 2x a week or until short term goals are met to address above goals. RECOMMENDED REHABILITATION/EQUIPMENT: (at time of discharge pending progress):   Rehab. SUBJECTIVE:   Recently medicated for a headache. History of Present Injury/Illness: Ms. Virgilio Gonzalez  has a past medical history of Erythrocytosis (2017); Essential hypertension; GDM (gestational diabetes mellitus) ( & ); and Psychiatric problem. She also has no past medical history of Abnormal Papanicolaou smear of cervix; Anemia; Asthma; Breast disorder; Chlamydia; Complication of anesthesia; Epilepsy ([address] Genital herpes; Gonorrhea; Heart abnormality; Herpes simplex virus (HSV) infection; Human immunodeficiency virus (HIV) disease ([address] Infertility, female; Kidney disease; Liver disease; Phlebitis and thrombophlebitis; Pituitary disorder ([address] Polycystic disease, ovaries; Postpartum depression; Rhesus isoimmunization affecting pregnancy; Sickle cell disease ([address] Syphilis; Systemic lupus erythematosus ([address] Thyroid activity decreased; or Trauma. She also  has a past surgical history that includes  delivery only () and wisdom teeth extraction. Present Symptoms: 30-60 mins of aphasia  Pain Intensity 1: 4  Pain Location 1: Head  Pain Orientation 1: Anterior, Lateral  Pain Intervention(s) 1: Medication (see MAR)  Current Medications:   No current facility-administered medications on file prior to encounter.       Current Outpatient Prescriptions on File Prior to Encounter   Medication Sig Dispense Refill    ibuprofen (MOTRIN) 800 mg tablet Take 1 Tab by mouth every eight (8) hours as needed. 30 Tab 0    oxyCODONE-acetaminophen (PERCOCET 7.5) 7.5-325 mg per tablet Take 2 Tabs by mouth every four (4) hours as needed. Max Daily Amount: 12 Tabs. 60 Tab 0    PRENATAL VIT W-CA,FE,FA,<1 MG, (PRENATAL VITAMIN PO) Take  by mouth daily.  SERTRALINE HCL (ZOLOFT PO) Take  by mouth daily. Current Dietary Status:  Cardiac regular      History of reflux:  Yes  pepcid    Social History/Home Situation: home with  and 2 children  Home Environment: Private residence  # Steps to Enter: 6  One/Two Story Residence: Two story  Living Alone: No  Support Systems: Child(allison), Family member(s), Friends \ neighbors, Spouse/Significant Other/Partner  Patient Expects to be Discharged to[de-identified] Private residence  Current DME Used/Available at Home: None  OBJECTIVE:   Respiratory Status:        CXR Results: negative  MRI/CT Results:CT negative; MRI pending  Oral Motor Structure/Speech:  Oral-Motor Structure/Motor Speech  Labial: No impairment  Dentition: Intact, Natural  Lingual: No impairment    Cognitive and Communication Status:  Neurologic State: Alert  Orientation Level: Oriented X4  Cognition: Appropriate decision making  Perception: Appears intact  Perseveration: No perseveration noted  Safety/Judgement: Awareness of environment    BEDSIDE SWALLOW EVALUATION  Oral Assessment:  Oral Assessment  Labial: No impairment  Dentition: Intact; Natural  Lingual: No impairment  P.O. Trials: The patient was given tsp to straw amounts of the following:   Consistency Presented: Thin liquid; Solid;Mixed consistency  How Presented: Successive swallows;Straw;Spoon;Self-fed/presented    ORAL PHASE:  Bolus Acceptance: No impairment  Bolus Formation/Control: No impairment  Propulsion: No impairment     Oral Residue: None    PHARYNGEAL PHASE:  Initiation of Swallow: No impairment  Laryngeal Elevation: Functional  Aspiration Signs/Symptoms: None              Pharyngeal Phase Characteristics: No impairment, issues, or problems     OTHER OBSERVATIONS:  Rate/bite size: WNL   Endurance:  Questionable     Tool Used: Dysphagia Outcome and Severity Scale (JERMAINE)    Score Comments   Normal Diet  [x] 7 With no strategies or extra time needed   Functional Swallow  [] 6 May have mild oral or pharyngeal delay       Mild Dysphagia    [] 5 Which may require one diet consistency restricted (those who demonstrate penetration which is entirely cleared on MBS would be included)   Mild-Moderate Dysphagia  [] 4 With 1-2 diet consistencies restricted       Moderate Dysphagia  [] 3 With 2 or more diet consistencies restricted       Moderately Severe Dysphagia  [] 2 With partial PO strategies (trials with ST only)       Severe Dysphagia  [] 1 With inability to tolerate any PO safely          Score:  Initial: 7 Most Recent: X (Date: -- )   Interpretation of Tool: The Dysphagia Outcome and Severity Scale (JERMAINE) is a simple, easy-to-use, 7-point scale developed to systematically rate the functional severity of dysphagia based on objective assessment and make recommendations for diet level, independence level, and type of nutrition. Score 7 6 5 4 3 2 1   Modifier CH CI CJ CK CL CM CN   ?  Swallowing:     - CURRENT STATUS: CH - 0% impaired, limited or restricted    - GOAL STATUS:  CH - 0% impaired, limited or restricted    - D/C STATUS:  [address] [place] [address] - 0% impaired, limited or restricted  Payor: CHIQUITA WRIGHT / Plan: YULI Fitzpatrick 40 / Product Type: PPO /     TREATMENT:    (In addition to Assessment/Re-Assessment sessions the following treatments were rendered)  Assessment/Reassessment only, no treatment provided today    __________________________________________________________________________________________________  Safety:   After treatment position/precautions:  · RN notified  · Upright in Bed  Progression/Medical Necessity:   · Skilled intervention continues to be required due to unable to attend/participate in therapy as expected. Compliance with Program/Exercises: Will assess as treatment progresses. Reason for Continuation of Services/Other Comments:  · Patient continues to require skilled intervention due to patient unable to attend/participate in therapy as expected. Recommendations/Intent for next treatment session: \"Treatment next visit will focus on cognitive assessment pending MRI results\".     Total Treatment Duration:  Time In: 1130  Time Out: 300 Carmine Byrd MS, SLP

## 2017-05-01 NOTE — PROGRESS NOTES
Primary Nurse Raul Leal RN and Stella Amezcua RN performed a dual skin assessment on this patient. Scattered abrasions to BLE. No other skin impairments noted. Stroke information guide and medication side effects sheet provided to and discussed with patient.

## 2017-05-01 NOTE — ED PROVIDER NOTES
HPI Comments: The patient had onset of right facial and right arm and left arm numbness with hyperventilation. Patient began having trouble with her speech. She did get out words but they did not make sense. This lasted for 30-60 minutes. Speech is resolved. Numbness is improving. Patient now complains of a headache but did not have one earlier. Patient complains of anxiety and requests medication. Patient is a 32 y.o. female presenting with numbness. The history is provided by the patient. Numbness   This is a new problem. Episode onset: 7 pm. The problem has been gradually improving. There was right facial, right upper extremity and left upper extremity focality noted. Primary symptoms include speech difficulty. Pertinent negatives include no loss of sensation, no slurred speech and no mental status change. There has been no fever. Associated symptoms include shortness of breath and chest pain. Pertinent negatives include no vomiting, no headaches and no nausea.         Past Medical History:   Diagnosis Date    Essential hypertension     Chronic vs anxiety    GDM (gestational diabetes mellitus)  & 2016    diet-controlled    Psychiatric problem     anxiety, OCD, and depression       Past Surgical History:   Procedure Laterality Date     DELIVERY ONLY      HX WISDOM TEETH EXTRACTION           Family History:   Problem Relation Age of Onset    Stroke Mother     Hypertension Father     Cancer Father     Diabetes Maternal Aunt     Bleeding Prob Paternal Aunt     Bleeding Prob Maternal Grandmother     Diabetes Maternal Grandfather     Cancer Paternal Grandfather     Alcohol abuse Neg Hx     Arthritis-osteo Neg Hx     Asthma Neg Hx     Elevated Lipids Neg Hx     Headache Neg Hx     Heart Disease Neg Hx     Lung Disease Neg Hx     Migraines Neg Hx     Psychiatric Disorder Neg Hx     Mental Retardation Neg Hx        Social History     Social History    Marital status:      Spouse name: N/A    Number of children: N/A    Years of education: N/A     Occupational History    Not on file. Social History Main Topics    Smoking status: Never Smoker    Smokeless tobacco: Not on file    Alcohol use No    Drug use: No    Sexual activity: Yes     Partners: Male     Other Topics Concern    Not on file     Social History Narrative         ALLERGIES: Biaxin [clarithromycin]    Review of Systems   Constitutional: Negative for chills and fever. HENT: Negative for congestion, rhinorrhea and sore throat. Eyes: Negative for photophobia and redness. Respiratory: Positive for shortness of breath. Negative for cough. Cardiovascular: Positive for chest pain. Negative for leg swelling. Gastrointestinal: Negative for abdominal pain, diarrhea, nausea and vomiting. Endocrine: Negative for polydipsia and polyuria. Genitourinary: Negative for dysuria. Musculoskeletal: Negative for back pain and myalgias. Neurological: Positive for speech difficulty and numbness. Negative for weakness and headaches. Vitals:    04/30/17 1918   BP: (!) 133/96   Pulse: (!) 128   Resp: (!) 40   Temp: 99.3 °F (37.4 °C)   SpO2: 98%   Weight: 136.1 kg (300 lb)   Height: 5' 7\" (1.702 m)            Physical Exam   Constitutional: She is oriented to person, place, and time. She appears well-developed and well-nourished. Eyes: Conjunctivae are normal. Pupils are equal, round, and reactive to light. Neck: Normal range of motion. Neck supple. Cardiovascular: Normal rate, regular rhythm and normal heart sounds. No murmur heard. Pulmonary/Chest: Breath sounds normal. No respiratory distress. Abdominal: Soft. She exhibits no distension. There is no tenderness. There is no rebound and no guarding. Musculoskeletal: Normal range of motion. She exhibits no edema or tenderness. Neurological: She is alert and oriented to person, place, and time. She has normal strength.  No cranial nerve deficit or sensory deficit. She exhibits normal muscle tone. Coordination normal. GCS eye subscore is 4. GCS verbal subscore is 5. GCS motor subscore is 6. Reflex Scores:       Patellar reflexes are 2+ on the right side and 2+ on the left side. Skin: Skin is warm and dry. MDM  Number of Diagnoses or Management Options  Diagnosis management comments: Clearly patient had an anxiety or panic attack. The speech was a bit odd and could be consistent with TIA. It is improved now. It is resolved now. Onset of headache now on CT Fernando negative. We'll treat headache. Complicated migraine possible as well.            Amount and/or Complexity of Data Reviewed  Clinical lab tests: ordered and reviewed (Results for orders placed or performed during the hospital encounter of 04/30/17  -CBC WITH AUTOMATED DIFF       Result                                            Value                         Ref Range                       WBC                                               12.0 (H)                      4.3 - 11.1 K/uL                 RBC                                               5.70 (H)                      4.05 - 5.25 M/uL                HGB                                               16.4 (H)                      11.7 - 15.4 g/dL                HCT                                               46.7 (H)                      35.8 - 46.3 %                   MCV                                               81.9                          79.6 - 97.8 FL                  MCH                                               28.8                          26.1 - 32.9 PG                  MCHC                                              35.1 (H)                      31.4 - 35.0 g/dL                RDW                                               13.2                          11.9 - 14.6 %                   PLATELET                                          410                           150 - 450 K/uL MPV                                               10.1 (L)                      10.8 - 14.1 FL                  DF                                                AUTOMATED                                                     NEUTROPHILS                                       57                            43 - 78 %                       LYMPHOCYTES                                       35                            13 - 44 %                       MONOCYTES                                         5                             4.0 - 12.0 %                    EOSINOPHILS                                       2                             0.5 - 7.8 %                     BASOPHILS                                         1                             0.0 - 2.0 %                     IMMATURE GRANULOCYTES                             0.3                           0.0 - 5.0 %                     ABS. NEUTROPHILS                                  6.8                           1.7 - 8.2 K/UL                  ABS. LYMPHOCYTES                                  4.2                           0.5 - 4.6 K/UL                  ABS. MONOCYTES                                    0.6                           0.1 - 1.3 K/UL                  ABS. EOSINOPHILS                                  0.3                           0.0 - 0.8 K/UL                  ABS. BASOPHILS                                    0.1                           0.0 - 0.2 K/UL                  ABS. IMM.  GRANS.                                  0.0                           0.0 - 0.5 K/UL             -METABOLIC PANEL, COMPREHENSIVE       Result                                            Value                         Ref Range                       Sodium                                            144                           136 - 145 mmol/L                Potassium                                         3.1 (L)                       3.5 - 5.1 mmol/L                Chloride 106                           98 - 107 mmol/L                 CO2                                               26                            21 - 32 mmol/L                  Anion gap                                         12                            7 - 16 mmol/L                   Glucose                                           130 (H)                       65 - 100 mg/dL                  BUN                                               15                            6 - 23 MG/DL                    Creatinine                                        0.96                          0.6 - 1.0 MG/DL                 GFR est AA                                        >60                           >60 ml/min/1.73m2               GFR est non-AA                                    >60                           >60 ml/min/1.73m2               Calcium                                           8.8                           8.3 - 10.4 MG/DL                Bilirubin, total                                  0.6                           0.2 - 1.1 MG/DL                 ALT (SGPT)                                        30                            12 - 65 U/L                     AST (SGOT)                                        13 (L)                        15 - 37 U/L                     Alk. phosphatase                                  72                            50 - 136 U/L                    Protein, total                                    7.8                           6.3 - 8.2 g/dL                  Albumin                                           4.2                           3.5 - 5.0 g/dL                  Globulin                                          3.6 (H)                       2.3 - 3.5 g/dL                  A-G Ratio                                         1.2                           1.2 - 3.5                  -TROPONIN I       Result Value                         Ref Range                       Troponin-I, Qt.                                   <0.02 (L)                     0.02 - 0.05 NG/ML          )  Tests in the radiology section of CPT®: ordered and reviewed (Xr Chest Pa Lat    Result Date: 4/30/2017  History: anxiety/chest pain Two views chest Findings: The lungs are well expanded and clear. The cardiac silhouette, and mediastinal contour, and osseous structures are normal.     Impression: Unremarkable two-view chest.     Ct Head Wo Cont    Result Date: 4/30/2017  History: friend reports pt can not talk. Exam: CT head without contrast Technique: Thin section axial CT images were obtained from the skullbase through the vertex. Radiation dose reduction techniques were used for this study. Our CT scanners use one or all of the following: Automated exposure control, adjustment of the mA and/or kV according to patient size, use of iterative reconstruction. Findings: The ventricles are normal in size, shape, and position. No abnormal parenchymal density is present. No extra-axial fluid collection is present. There is no mass or mass-effect. The basilar cisterns are patent. The paranasal sinuses and mastoid air cells are clear.      Impression: Unremarkable CT head without contrast.     )      ED Course       Procedures

## 2017-05-01 NOTE — ED NOTES
TRANSFER - OUT REPORT:    Verbal report given to Teddy Jenkins RN on Anny Ledbetter  being transferred to 7th Floor for routine progression of care       Report consisted of patients Situation, Background, Assessment and   Recommendations(SBAR). Information from the following report(s) SBAR was reviewed with the receiving nurse. Lines:   Peripheral IV 04/30/17 Left; Outer;Mid Arm (Active)        Opportunity for questions and clarification was provided.       Patient transported with:   [employer]

## 2017-05-01 NOTE — PROGRESS NOTES
D: Pt complained of worsening headache a \"6\" from 0-10. Tylenol administered in AM was not helpful. A: MD aware and ordered norco.  R: Pt given norco and resting.

## 2017-05-02 LAB — EPO SERPL-ACNC: 5.8 MIU/ML (ref 2.6–18.5)

## 2021-06-09 ENCOUNTER — HOSPITAL ENCOUNTER (OUTPATIENT)
Dept: LAB | Age: 31
Discharge: HOME | End: 2021-06-09

## 2021-06-09 PROCEDURE — 88305 TISSUE EXAM BY PATHOLOGIST: CPT

## 2022-03-18 PROBLEM — J01.00 ACUTE NON-RECURRENT MAXILLARY SINUSITIS: Status: ACTIVE | Noted: 2017-05-01

## 2022-03-18 PROBLEM — E87.6 HYPOKALEMIA: Status: ACTIVE | Noted: 2017-04-30

## 2022-03-18 PROBLEM — F41.0 ANXIETY ATTACK: Status: ACTIVE | Noted: 2017-04-30

## 2022-03-19 PROBLEM — D75.1 ERYTHROCYTOSIS: Status: ACTIVE | Noted: 2017-04-30

## 2022-03-19 PROBLEM — G45.9 TIA (TRANSIENT ISCHEMIC ATTACK): Status: ACTIVE | Noted: 2017-04-30

## 2022-03-19 PROBLEM — R51.9 HEADACHE: Status: ACTIVE | Noted: 2017-04-30

## 2022-03-19 PROBLEM — I51.9 LEFT VENTRICULAR SYSTOLIC DYSFUNCTION WITHOUT HEART FAILURE: Status: ACTIVE | Noted: 2017-05-01

## 2022-03-20 PROBLEM — E66.9 OBESITY: Status: ACTIVE | Noted: 2017-04-30

## 2022-06-11 NOTE — PROGRESS NOTES
aDate: 2022      Name: Chelo Yeung      MRN: [id number]       : 1990       Age: 32 y.o. Sex: female        Surya Renata, APRN - NP       CC:  No chief complaint on file. HPI:     Chelo Yeung is a 32 y.o. female who presents for evaluation of gallbladder problems. The patient had an ultrasound done at [facility] on 22 which showed \"extensive cholelithiasis\" and a stable 26 mm left hepatic lobe cavernous hemangioma. The patient has had generalized abdominal pain, chronic diarrhea and bloating. A CCK HIDA scan was done on 22 at [facility] which showed no filling of the gallbladder after 2 hours. It was thought she may have had acute cholecystitis. She is referred to me 39 days after her CCK HIDA scan. Problems with abdominal pain, intermittent, for the past year. No pain at present. No recent nausea or vomiting. PMH:    Past Medical History:   Diagnosis Date    Erythrocytosis 2017    Essential hypertension     Chronic vs anxiety    GDM (gestational diabetes mellitus)  &     diet-controlled    Left ventricular systolic dysfunction without heart failure 2017    Psychiatric problem     anxiety, OCD, and depression       PSH:    Past Surgical History:   Procedure Laterality Date     DELIVERY ONLY      x2    WISDOM TOOTH EXTRACTION         MEDS:    [unfilled]    ALLERGIES:      Allergies   Allergen Reactions    Clarithromycin Hives       SH:    Social History     Tobacco Use    Smoking status: Never Smoker    Smokeless tobacco: Never Used   Substance Use Topics    Alcohol use:  Yes    Drug use: Yes     Types: Marijuana Sandeep Felizs)       FH:    Family History   Problem Relation Age of Onset    Hypertension Father     Ovarian Cancer Paternal Aunt     Bleeding Prob Maternal Grandmother     Diabetes Maternal Grandfather     Cancer Paternal Grandfather     Alcohol Abuse Neg Hx     Osteoarthritis Neg Hx     Asthma Neg Hx     Elevated Lipids Neg Hx     Headache Neg Hx     Heart Disease Neg Hx     Lung Disease Neg Hx     Migraines Neg Hx     Diabetes Maternal Aunt     Cancer Father     Bleeding Prob Paternal Aunt     Stroke Mother     Mental Retardation Neg Hx     Psychiatric Disorder Neg Hx        ROS: The patient has no difficulty with chest pain or shortness of breath. No fever or chills. Comprehensive 13 point review of systems was otherwise unremarkable except as noted above. Physical Exam:     There were no vitals taken for this visit. General: Alert, oriented, cooperative female in no acute distress. Eyes: Sclera are clear. Conjunctiva and lids within normal limits. No icterus. Ears and Nose: no gross deformities to visual inspection, gross hearing intact  Neck: Supple, trachea midline, no appreciable thyromegaly  Resp: Breathing is  non-labored. Lungs clear to auscultation without wheezing or rhonchi   CV: RRR. No murmurs, rubs or gallops appreciated. Abd: soft, obese, mild RUQ tenderness to palpation, active BS'S. Psych:  Mood and affect appropriate. Short-term memory and understanding intact      Assessment/Plan:  January Carrion is a 32 y.o. female who has signs and symptoms consistent with cholelithiasis/cholecystitis. 1. Laparoscopic, possible open, cholecystectomy. I went through the risks of bleeding, infection and anesthesia. I went through other risks of injury to the liver, biliary tree structures, stomach, small bowel, large bowel , pancreas and the potential need to convert to an open procedure.     Lizbeth Soliz MD     [facility]   6/11/2022  4:59 PM

## 2022-06-14 ENCOUNTER — OFFICE VISIT (OUTPATIENT)
Dept: SURGERY | Age: 32
End: 2022-06-14
Payer: COMMERCIAL

## 2022-06-14 VITALS — WEIGHT: 244 LBS | BODY MASS INDEX: 38.3 KG/M2 | HEIGHT: 67 IN

## 2022-06-14 DIAGNOSIS — K80.10 CALCULUS OF GALLBLADDER WITH CHRONIC CHOLECYSTITIS WITHOUT OBSTRUCTION: Primary | ICD-10-CM

## 2022-06-14 PROCEDURE — 99203 OFFICE O/P NEW LOW 30 MIN: CPT | Performed by: SURGERY

## 2022-06-14 RX ORDER — FLUOXETINE 20 MG/1
20 TABLET, FILM COATED ORAL DAILY
COMMUNITY
Start: 2022-06-13

## 2022-06-15 ENCOUNTER — HOSPITAL ENCOUNTER (OUTPATIENT)
Dept: ULTRASOUND IMAGING | Age: 32
Discharge: HOME | End: 2022-06-18
Payer: COMMERCIAL

## 2022-06-15 DIAGNOSIS — R59.1 HEAD AND NECK LYMPHADENOPATHY: ICD-10-CM

## 2022-06-15 PROCEDURE — 76536 US EXAM OF HEAD AND NECK: CPT

## 2022-06-28 NOTE — H&P
aDate: 2022      Name: Whitney Goldsmith      MRN: [id number]       : 1990       Age: 32 y.o. Sex: female        MEGAN Candelaria - NP       CC:  No chief complaint on file. HPI:     Whitney Goldsmith is a 32 y.o. female who presents for evaluation of gallbladder problems. The patient had an ultrasound done at [facility] on 22 which showed \"extensive cholelithiasis\" and a stable 26 mm left hepatic lobe cavernous hemangioma. The patient has had generalized abdominal pain, chronic diarrhea and bloating. A CCK HIDA scan was done on 22 at [facility] which showed no filling of the gallbladder after 2 hours. It was thought she may have had acute cholecystitis. She is referred to me 39 days after her CCK HIDA scan. Problems with abdominal pain, intermittent, for the past year. No pain at present. No recent nausea or vomiting. PMH:    Past Medical History:   Diagnosis Date    Erythrocytosis 2017    Essential hypertension     Chronic vs anxiety    GDM (gestational diabetes mellitus)  &     diet-controlled    Left ventricular systolic dysfunction without heart failure 2017    Psychiatric problem     anxiety, OCD, and depression       PSH:    Past Surgical History:   Procedure Laterality Date     DELIVERY ONLY      x2    WISDOM TOOTH EXTRACTION         MEDS:    [unfilled]    ALLERGIES:      Allergies   Allergen Reactions    Clarithromycin Hives       SH:    Social History     Tobacco Use    Smoking status: Never Smoker    Smokeless tobacco: Never Used   Substance Use Topics    Alcohol use:  Yes    Drug use: Yes     Types: Marijuana Rubina Gutiérrez)       FH:    Family History   Problem Relation Age of Onset    Hypertension Father     Ovarian Cancer Paternal Aunt     Bleeding Prob Maternal Grandmother     Diabetes Maternal Grandfather     Cancer Paternal Grandfather     Alcohol Abuse Neg Hx     Osteoarthritis Neg Hx     Asthma Neg Hx     Elevated Lipids Neg Hx     Headache Neg Hx     Heart Disease Neg Hx     Lung Disease Neg Hx     Migraines Neg Hx     Diabetes Maternal Aunt     Cancer Father     Bleeding Prob Paternal Aunt     Stroke Mother     Mental Retardation Neg Hx     Psychiatric Disorder Neg Hx        ROS: The patient has no difficulty with chest pain or shortness of breath. No fever or chills. Comprehensive 13 point review of systems was otherwise unremarkable except as noted above. Physical Exam:     [facility] 06/09/2022     General: Alert, oriented, cooperative female in no acute distress. Eyes: Sclera are clear. Conjunctiva and lids within normal limits. No icterus. Ears and Nose: no gross deformities to visual inspection, gross hearing intact  Neck: Supple, trachea midline, no appreciable thyromegaly  Resp: Breathing is  non-labored. Lungs clear to auscultation without wheezing or rhonchi   CV: RRR. No murmurs, rubs or gallops appreciated. Abd: soft, obese, mild RUQ tenderness to palpation, active BS'S. Psych:  Mood and affect appropriate. Short-term memory and understanding intact      Assessment/Plan:  Whitney Goldsmith is a 32 y.o. female who has signs and symptoms consistent with cholelithiasis/cholecystitis. 1. Laparoscopic, possible open, cholecystectomy. I went through the risks of bleeding, infection and anesthesia. I went through other risks of injury to the liver, biliary tree structures, stomach, small bowel, large bowel , pancreas and the potential need to convert to an open procedure.     Martha Courtney MD     [facility]   6/28/2022  5:55 PM

## 2022-06-29 NOTE — PROGRESS NOTES
Patient verified name and . Order for consent not found in EHR; patient verifies procedure. Type 2 surgery, Phone assessment complete. Orders not received. Labs per surgeon: none  Labs per anesthesia protocol: Hgb    Patient answered medical/surgical history questions at their best of ability. All prior to admission medications documented in [facility]. Patient instructed to take the following medications the day of surgery according to anesthesia guidelines with a small sip of water: Prozac and Deplin. On the day before surgery please take Acetaminophen 1000mg in the morning and then again before bed. You may substitute for Tylenol 650 mg. Hold all vitamins 7 days prior to surgery and NSAIDS 5 days prior to surgery. Prescription meds to hold:no additional.    Patient instructed on the following:    > Arrive at main Entrance, time of arrival to be called the day before by 1700  > NPO after midnight, unless otherwise indicated, including gum, mints, and ice chips  > Responsible adult must drive patient to the hospital, stay during surgery, and patient will need supervision 24 hours after anesthesia  > Use antibacterial Soap in shower the night before surgery and on the morning of surgery  > All piercings must be removed prior to arrival.    > Leave all valuables (money and jewelry) at home but bring insurance card and ID on DOS.   > You may be required to pay a deductible or co-pay on the day of your procedure. You can pre-pay by calling [phone] if your surgery is at the [facility] or [phone] if your surgery is at the [facility]. > Do not wear make-up, nail polish, lotions, cologne, perfumes, powders, or oil on skin. Artificial nails are not permitted.

## 2022-06-30 NOTE — PERIOP NOTE
Directly informed patient and or family member of pre op arrival time 21  on 7/1. All questions answered. Pre op instructions reviewed. Left contact information for any additional questions or needs.

## 2022-07-01 ENCOUNTER — ANESTHESIA EVENT (OUTPATIENT)
Dept: SURGERY | Age: 32
End: 2022-07-01
Payer: COMMERCIAL

## 2022-07-01 ENCOUNTER — ANESTHESIA (OUTPATIENT)
Dept: SURGERY | Age: 32
End: 2022-07-01
Payer: COMMERCIAL

## 2022-07-01 ENCOUNTER — HOSPITAL ENCOUNTER (OUTPATIENT)
Age: 32
Setting detail: OUTPATIENT SURGERY
Discharge: HOME | End: 2022-07-01
Attending: SURGERY | Admitting: SURGERY
Payer: COMMERCIAL

## 2022-07-01 VITALS
OXYGEN SATURATION: 95 % | TEMPERATURE: 97.8 F | HEART RATE: 71 BPM | WEIGHT: 244 LBS | HEIGHT: 67 IN | DIASTOLIC BLOOD PRESSURE: 78 MMHG | SYSTOLIC BLOOD PRESSURE: 147 MMHG | RESPIRATION RATE: 16 BRPM | BODY MASS INDEX: 38.3 KG/M2

## 2022-07-01 DIAGNOSIS — K80.12 CALCULUS OF GALLBLADDER WITH ACUTE ON CHRONIC CHOLECYSTITIS WITHOUT OBSTRUCTION: Primary | ICD-10-CM

## 2022-07-01 LAB
HCG UR QL: NEGATIVE
HGB BLD-MCNC: 15.8 G/DL (ref 11.7–15.4)

## 2022-07-01 PROCEDURE — 2709999900 HC NON-CHARGEABLE SUPPLY: Performed by: SURGERY

## 2022-07-01 PROCEDURE — 2500000003 HC RX 250 WO HCPCS: Performed by: SURGERY

## 2022-07-01 PROCEDURE — 3600000019 HC SURGERY ROBOT ADDTL 15MIN: Performed by: SURGERY

## 2022-07-01 PROCEDURE — 6360000002 HC RX W HCPCS: Performed by: ANESTHESIOLOGY

## 2022-07-01 PROCEDURE — 6370000000 HC RX 637 (ALT 250 FOR IP): Performed by: ANESTHESIOLOGY

## 2022-07-01 PROCEDURE — 81025 URINE PREGNANCY TEST: CPT

## 2022-07-01 PROCEDURE — 2580000003 HC RX 258: Performed by: ANESTHESIOLOGY

## 2022-07-01 PROCEDURE — 6360000002 HC RX W HCPCS: Performed by: SURGERY

## 2022-07-01 PROCEDURE — C1713 ANCHOR/SCREW BN/BN,TIS/BN: HCPCS | Performed by: SURGERY

## 2022-07-01 PROCEDURE — 47562 LAPAROSCOPIC CHOLECYSTECTOMY: CPT | Performed by: SURGERY

## 2022-07-01 PROCEDURE — 2500000003 HC RX 250 WO HCPCS: Performed by: NURSE ANESTHETIST, CERTIFIED REGISTERED

## 2022-07-01 PROCEDURE — 85018 HEMOGLOBIN: CPT

## 2022-07-01 PROCEDURE — 3700000001 HC ADD 15 MINUTES (ANESTHESIA): Performed by: SURGERY

## 2022-07-01 PROCEDURE — S2900 ROBOTIC SURGICAL SYSTEM: HCPCS | Performed by: SURGERY

## 2022-07-01 PROCEDURE — 2720000010 HC SURG SUPPLY STERILE: Performed by: SURGERY

## 2022-07-01 PROCEDURE — 6360000002 HC RX W HCPCS: Performed by: NURSE ANESTHETIST, CERTIFIED REGISTERED

## 2022-07-01 PROCEDURE — 7100000010 HC PHASE II RECOVERY - FIRST 15 MIN: Performed by: SURGERY

## 2022-07-01 PROCEDURE — 7100000001 HC PACU RECOVERY - ADDTL 15 MIN: Performed by: SURGERY

## 2022-07-01 PROCEDURE — 7100000000 HC PACU RECOVERY - FIRST 15 MIN: Performed by: SURGERY

## 2022-07-01 PROCEDURE — 3700000000 HC ANESTHESIA ATTENDED CARE: Performed by: SURGERY

## 2022-07-01 PROCEDURE — 7100000011 HC PHASE II RECOVERY - ADDTL 15 MIN: Performed by: SURGERY

## 2022-07-01 PROCEDURE — 88304 TISSUE EXAM BY PATHOLOGIST: CPT

## 2022-07-01 PROCEDURE — 3600000009 HC SURGERY ROBOT BASE: Performed by: SURGERY

## 2022-07-01 RX ORDER — KETOROLAC TROMETHAMINE 30 MG/ML
30 INJECTION, SOLUTION INTRAMUSCULAR; INTRAVENOUS ONCE
Status: COMPLETED | OUTPATIENT
Start: 2022-07-01 | End: 2022-07-01

## 2022-07-01 RX ORDER — HYDROMORPHONE HYDROCHLORIDE 2 MG/ML
0.5 INJECTION, SOLUTION INTRAMUSCULAR; INTRAVENOUS; SUBCUTANEOUS EVERY 5 MIN PRN
Status: DISCONTINUED | OUTPATIENT
Start: 2022-07-01 | End: 2022-07-01 | Stop reason: HOSPADM

## 2022-07-01 RX ORDER — OXYCODONE HYDROCHLORIDE 5 MG/1
10 TABLET ORAL PRN
Status: COMPLETED | OUTPATIENT
Start: 2022-07-01 | End: 2022-07-01

## 2022-07-01 RX ORDER — PROCHLORPERAZINE EDISYLATE 5 MG/ML
5 INJECTION INTRAMUSCULAR; INTRAVENOUS
Status: COMPLETED | OUTPATIENT
Start: 2022-07-01 | End: 2022-07-01

## 2022-07-01 RX ORDER — MIDAZOLAM HYDROCHLORIDE 2 MG/2ML
2 INJECTION, SOLUTION INTRAMUSCULAR; INTRAVENOUS
Status: COMPLETED | OUTPATIENT
Start: 2022-07-01 | End: 2022-07-01

## 2022-07-01 RX ORDER — SODIUM CHLORIDE 0.9 % (FLUSH) 0.9 %
5-40 SYRINGE (ML) INJECTION EVERY 12 HOURS SCHEDULED
Status: DISCONTINUED | OUTPATIENT
Start: 2022-07-01 | End: 2022-07-01 | Stop reason: HOSPADM

## 2022-07-01 RX ORDER — ACETAMINOPHEN 500 MG
1000 TABLET ORAL ONCE
Status: COMPLETED | OUTPATIENT
Start: 2022-07-01 | End: 2022-07-01

## 2022-07-01 RX ORDER — SODIUM CHLORIDE, SODIUM LACTATE, POTASSIUM CHLORIDE, CALCIUM CHLORIDE 600; 310; 30; 20 MG/100ML; MG/100ML; MG/100ML; MG/100ML
INJECTION, SOLUTION INTRAVENOUS CONTINUOUS
Status: DISCONTINUED | OUTPATIENT
Start: 2022-07-01 | End: 2022-07-01 | Stop reason: HOSPADM

## 2022-07-01 RX ORDER — ONDANSETRON 2 MG/ML
4 INJECTION INTRAMUSCULAR; INTRAVENOUS
Status: COMPLETED | OUTPATIENT
Start: 2022-07-01 | End: 2022-07-01

## 2022-07-01 RX ORDER — OXYCODONE HYDROCHLORIDE AND ACETAMINOPHEN 5; 325 MG/1; MG/1
1 TABLET ORAL EVERY 6 HOURS PRN
Qty: 20 TABLET | Refills: 0 | Status: SHIPPED | OUTPATIENT
Start: 2022-07-01 | End: 2022-07-06

## 2022-07-01 RX ORDER — BUPIVACAINE HYDROCHLORIDE 5 MG/ML
INJECTION, SOLUTION EPIDURAL; INTRACAUDAL PRN
Status: DISCONTINUED | OUTPATIENT
Start: 2022-07-01 | End: 2022-07-01 | Stop reason: HOSPADM

## 2022-07-01 RX ORDER — VITAMIN B COMPLEX
TABLET ORAL
COMMUNITY
Start: 2022-04-07

## 2022-07-01 RX ORDER — SODIUM CHLORIDE 0.9 % (FLUSH) 0.9 %
5-40 SYRINGE (ML) INJECTION PRN
Status: DISCONTINUED | OUTPATIENT
Start: 2022-07-01 | End: 2022-07-01 | Stop reason: HOSPADM

## 2022-07-01 RX ORDER — SODIUM CHLORIDE 9 MG/ML
INJECTION, SOLUTION INTRAVENOUS PRN
Status: DISCONTINUED | OUTPATIENT
Start: 2022-07-01 | End: 2022-07-01 | Stop reason: HOSPADM

## 2022-07-01 RX ORDER — NEOSTIGMINE METHYLSULFATE 1 MG/ML
INJECTION, SOLUTION INTRAVENOUS PRN
Status: DISCONTINUED | OUTPATIENT
Start: 2022-07-01 | End: 2022-07-01 | Stop reason: SDUPTHER

## 2022-07-01 RX ORDER — SODIUM CHLORIDE, SODIUM LACTATE, POTASSIUM CHLORIDE, CALCIUM CHLORIDE 600; 310; 30; 20 MG/100ML; MG/100ML; MG/100ML; MG/100ML
125 INJECTION, SOLUTION INTRAVENOUS CONTINUOUS
Status: DISCONTINUED | OUTPATIENT
Start: 2022-07-01 | End: 2022-07-01 | Stop reason: HOSPADM

## 2022-07-01 RX ORDER — ONDANSETRON 2 MG/ML
INJECTION INTRAMUSCULAR; INTRAVENOUS PRN
Status: DISCONTINUED | OUTPATIENT
Start: 2022-07-01 | End: 2022-07-01 | Stop reason: SDUPTHER

## 2022-07-01 RX ORDER — GLYCOPYRROLATE 0.2 MG/ML
INJECTION INTRAMUSCULAR; INTRAVENOUS PRN
Status: DISCONTINUED | OUTPATIENT
Start: 2022-07-01 | End: 2022-07-01 | Stop reason: SDUPTHER

## 2022-07-01 RX ORDER — LIDOCAINE HYDROCHLORIDE 20 MG/ML
INJECTION, SOLUTION EPIDURAL; INFILTRATION; INTRACAUDAL; PERINEURAL PRN
Status: DISCONTINUED | OUTPATIENT
Start: 2022-07-01 | End: 2022-07-01 | Stop reason: SDUPTHER

## 2022-07-01 RX ORDER — PROPOFOL 10 MG/ML
INJECTION, EMULSION INTRAVENOUS PRN
Status: DISCONTINUED | OUTPATIENT
Start: 2022-07-01 | End: 2022-07-01 | Stop reason: SDUPTHER

## 2022-07-01 RX ORDER — ROCURONIUM BROMIDE 10 MG/ML
INJECTION, SOLUTION INTRAVENOUS PRN
Status: DISCONTINUED | OUTPATIENT
Start: 2022-07-01 | End: 2022-07-01 | Stop reason: SDUPTHER

## 2022-07-01 RX ORDER — DEXAMETHASONE SODIUM PHOSPHATE 10 MG/ML
INJECTION INTRAMUSCULAR; INTRAVENOUS PRN
Status: DISCONTINUED | OUTPATIENT
Start: 2022-07-01 | End: 2022-07-01 | Stop reason: SDUPTHER

## 2022-07-01 RX ORDER — HYDROMORPHONE HYDROCHLORIDE 2 MG/ML
0.25 INJECTION, SOLUTION INTRAMUSCULAR; INTRAVENOUS; SUBCUTANEOUS EVERY 5 MIN PRN
Status: DISCONTINUED | OUTPATIENT
Start: 2022-07-01 | End: 2022-07-01 | Stop reason: HOSPADM

## 2022-07-01 RX ORDER — DIPHENHYDRAMINE HYDROCHLORIDE 50 MG/ML
12.5 INJECTION INTRAMUSCULAR; INTRAVENOUS
Status: DISCONTINUED | OUTPATIENT
Start: 2022-07-01 | End: 2022-07-01 | Stop reason: HOSPADM

## 2022-07-01 RX ORDER — OXYCODONE HYDROCHLORIDE 5 MG/1
5 TABLET ORAL PRN
Status: COMPLETED | OUTPATIENT
Start: 2022-07-01 | End: 2022-07-01

## 2022-07-01 RX ORDER — FENTANYL CITRATE 50 UG/ML
INJECTION, SOLUTION INTRAMUSCULAR; INTRAVENOUS PRN
Status: DISCONTINUED | OUTPATIENT
Start: 2022-07-01 | End: 2022-07-01 | Stop reason: SDUPTHER

## 2022-07-01 RX ORDER — CLONIDINE HYDROCHLORIDE 0.1 MG/1
TABLET ORAL
COMMUNITY
Start: 2022-04-26

## 2022-07-01 RX ADMIN — ONDANSETRON 4 MG: 2 INJECTION INTRAMUSCULAR; INTRAVENOUS at 13:57

## 2022-07-01 RX ADMIN — ONDANSETRON 4 MG: 2 INJECTION INTRAMUSCULAR; INTRAVENOUS at 12:11

## 2022-07-01 RX ADMIN — KETOROLAC TROMETHAMINE 30 MG: 30 INJECTION, SOLUTION INTRAMUSCULAR at 13:57

## 2022-07-01 RX ADMIN — FENTANYL CITRATE 50 MCG: 50 INJECTION, SOLUTION INTRAMUSCULAR; INTRAVENOUS at 12:29

## 2022-07-01 RX ADMIN — SODIUM CHLORIDE, POTASSIUM CHLORIDE, SODIUM LACTATE AND CALCIUM CHLORIDE: 600; 310; 30; 20 INJECTION, SOLUTION INTRAVENOUS at 11:19

## 2022-07-01 RX ADMIN — Medication 2000 MG: at 12:11

## 2022-07-01 RX ADMIN — ROCURONIUM BROMIDE 40 MG: 50 INJECTION, SOLUTION INTRAVENOUS at 12:01

## 2022-07-01 RX ADMIN — ACETAMINOPHEN 1000 MG: 500 TABLET, FILM COATED ORAL at 11:21

## 2022-07-01 RX ADMIN — FENTANYL CITRATE 50 MCG: 50 INJECTION, SOLUTION INTRAMUSCULAR; INTRAVENOUS at 13:05

## 2022-07-01 RX ADMIN — GLYCOPYRROLATE 0.4 MG: 0.2 INJECTION, SOLUTION INTRAMUSCULAR; INTRAVENOUS at 13:23

## 2022-07-01 RX ADMIN — FENTANYL CITRATE 50 MCG: 50 INJECTION, SOLUTION INTRAMUSCULAR; INTRAVENOUS at 12:52

## 2022-07-01 RX ADMIN — FENTANYL CITRATE 50 MCG: 50 INJECTION, SOLUTION INTRAMUSCULAR; INTRAVENOUS at 12:01

## 2022-07-01 RX ADMIN — PROPOFOL 20 MG: 10 INJECTION, EMULSION INTRAVENOUS at 13:23

## 2022-07-01 RX ADMIN — PROPOFOL 200 MG: 10 INJECTION, EMULSION INTRAVENOUS at 12:01

## 2022-07-01 RX ADMIN — DEXAMETHASONE SODIUM PHOSPHATE 10 MG: 10 INJECTION INTRAMUSCULAR; INTRAVENOUS at 12:11

## 2022-07-01 RX ADMIN — OXYCODONE 10 MG: 5 TABLET ORAL at 14:38

## 2022-07-01 RX ADMIN — Medication 3 MG: at 13:23

## 2022-07-01 RX ADMIN — PROCHLORPERAZINE EDISYLATE 5 MG: 5 INJECTION INTRAMUSCULAR; INTRAVENOUS at 13:45

## 2022-07-01 RX ADMIN — LIDOCAINE HYDROCHLORIDE 100 MG: 20 INJECTION, SOLUTION EPIDURAL; INFILTRATION; INTRACAUDAL; PERINEURAL at 12:01

## 2022-07-01 RX ADMIN — MIDAZOLAM HYDROCHLORIDE 2 MG: 1 INJECTION, SOLUTION INTRAMUSCULAR; INTRAVENOUS at 11:55

## 2022-07-01 ASSESSMENT — PAIN - FUNCTIONAL ASSESSMENT
PAIN_FUNCTIONAL_ASSESSMENT: 0-10
PAIN_FUNCTIONAL_ASSESSMENT: NONE - DENIES PAIN
PAIN_FUNCTIONAL_ASSESSMENT: 0-10
PAIN_FUNCTIONAL_ASSESSMENT: 0-10

## 2022-07-01 ASSESSMENT — PAIN DESCRIPTION - LOCATION: LOCATION: ABDOMEN

## 2022-07-01 ASSESSMENT — PAIN SCALES - GENERAL: PAINLEVEL_OUTOF10: 6

## 2022-07-01 NOTE — ANESTHESIA POSTPROCEDURE EVALUATION
Department of Anesthesiology  Postprocedure Note    Patient: Yessenia Roa  MRN: [id number]  YOB: 1990  Date of evaluation: 7/1/2022      Procedure Summary     Date: 07/01/22 Room / Location: [facility] MAIN OR  / [facility] MAIN OR    Anesthesia Start: 7191 Anesthesia Stop: 2288    Procedure: CHOLECYSTECTOMY LAPAROSCOPIC ROBOTIC (N/A Abdomen) Diagnosis:       Biliary dyskinesia      (Biliary dyskinesia [K82.8])    Providers: Candice Simpson MD Responsible Provider: Reginaldo Cueva MD    Anesthesia Type: general ASA Status: 1          Anesthesia Type: No value filed.     Lizeth Phase I: Lizeth Score: 8    Lizeth Phase II: Lizeth Score: 10      Anesthesia Post Evaluation    Patient location during evaluation: PACU  Patient participation: complete - patient participated  Level of consciousness: awake and alert  Pain score: 2  Airway patency: patent  Nausea & Vomiting: no nausea and no vomiting  Complications: no  Cardiovascular status: blood pressure returned to baseline  Respiratory status: acceptable  Hydration status: euvolemic  Comments: BP (!) 147/78   Pulse 71   Temp 97.8 °F (36.6 °C) (Temporal)   Resp 16   Ht 5' 7\" (1.702 m)   Wt 244 lb (110.7 kg)   LMP 06/09/2022   SpO2 95%   BMI 38.22 kg/m²   Multimodal analgesia pain management approach

## 2022-07-01 NOTE — INTERVAL H&P NOTE
Update History & Physical    The patient's History and Physical of 6/28/22 was reviewed with the patient and I examined the patient. There was no change. The surgical site was confirmed by the patient and me. Plan: The risks, benefits, expected outcome, and alternative to the recommended procedure have been discussed with the patient. Patient understands and wants to proceed with the procedure.      Electronically signed by Gonzalo Sampson MD on 7/1/2022 at 10:48 AM

## 2022-07-01 NOTE — BRIEF OP NOTE
Brief Postoperative Note      Patient: Benton Stephen  YOB: 1990  MRN: [id number]    Date of Procedure: 7/1/2022    Pre-Op Diagnosis: Biliary dyskinesia [K82.8]    Post-Op Diagnosis: Hydrops of the gallbladder       Procedure: Robotic assisted cholecystectomy    Surgeon(s):  Peggy Esquivel MD    Assistant:  * No surgical staff found *    Anesthesia: General plus local    Estimated Blood Loss (mL): less than 50     Complications: None    Specimens:   ID Type Source Tests Collected by Time Destination   A : Gallbladder Tissue Gallbladder SURGICAL PATHOLOGY Peggy Esquivel MD 7/1/2022 1255        Implants:  * No implants in log *      Drains: * No LDAs found *    Findings: See dictated note.     Electronically signed by Gonzalo Sampson MD on 7/1/2022 at 1:29 PM

## 2022-07-01 NOTE — ANESTHESIA PRE PROCEDURE
Department of Anesthesiology  Preprocedure Note       Name:  Haider Mcmanus   Age:  32 y.o.  :  1990                                          MRN:  [id number]         Date:  2022      Surgeon: Ashley Fowler):  Mary Ann Salguero MD    Procedure: Medications prior to admission:   Prior to Admission medications    Medication Sig Start Date End Date Taking?  Authorizing Provider   cloNIDine (CATAPRES) 0.1 MG tablet TAKE 1/2 TO 1 TABLET BY MOUTH DAILY AS NEEDED FOR PANIC ATTACKS 22  Yes Historical Provider, MD   cyanocobalamin 2500 MCG SUBL PLACE 1 TABLET UNDER THE TONGUE AND ALLOW TO DISSOLVE ONCE DAILY 22  Yes Historical Provider, MD   L-Methylfolate-Algae (DEPLIN 15 PO) Take by mouth daily   Yes Historical Provider, MD   vitamin D (CHOLECALCIFEROL) 30910 UNIT CAPS TAKE 1 CAPSULE BY MOUTH ONCE A WEEK 22   Historical Provider, MD   FLUoxetine (PROZAC) 20 MG tablet Take 20 mg by mouth daily  22   Historical Provider, MD   ibuprofen (ADVIL;MOTRIN) 800 MG tablet Take 800 mg by mouth every 8 hours as needed 3/18/16   Ar Automatic Reconciliation       Current medications:    Current Facility-Administered Medications   Medication Dose Route Frequency Provider Last Rate Last Admin    lidocaine 1 % injection 1 mL  1 mL IntraDERmal Once PRN Elizabeth Barnhart IV, MD        lactated ringers infusion   IntraVENous Continuous Elizabeth Barnhart IV,  mL/hr at 22 1119 New Bag at 22 1119    sodium chloride flush 0.9 % injection 5-40 mL  5-40 mL IntraVENous 2 times per day Elizabeth Barnhart IV, MD        sodium chloride flush 0.9 % injection 5-40 mL  5-40 mL IntraVENous PRN Elizabeth Barnhart IV, MD        0.9 % sodium chloride infusion   IntraVENous PRN Elizabeth Barnhart IV, MD        midazolam PF (VERSED) injection 2 mg  2 mg IntraVENous Once PRN Elizabeth Barnhart IV, MD        ceFAZolin (ANCEF) 2000 mg in sterile water 20 mL IV syringe  2,000 mg IntraVENous On Call to MD Jaskaran Mckeon Raw bupivacaine (PF) (MARCAINE) 0.5 % injection    PRN Karina Dalton MD   30 mL at 22 1146       Allergies:     Allergies   Allergen Reactions    Clarithromycin Hives       Problem List:    Patient Active Problem List   Diagnosis Code    Anxiety attack F41.0    Hypokalemia E87.6    Acute non-recurrent maxillary sinusitis J01.00    Status post repeat low transverse  section Z98.891    H/O  section Z98.891    Left ventricular systolic dysfunction without heart failure I51.9    TIA (transient ischemic attack) G45.9    Headache R51.9    Erythrocytosis D75.1    Obesity E66.9       Past Medical History:        Diagnosis Date    Anxiety and depression     Biliary dyskinesia     Erythrocytosis 2017    Essential hypertension     no current meds- resolved with weight loss per pt    GDM (gestational diabetes mellitus)  &     diet-controlled    Left ventricular systolic dysfunction without heart failure 2017    OCD (obsessive compulsive disorder)     Vitamin D deficiency        Past Surgical History:        Procedure Laterality Date     DELIVERY ONLY      x2    WISDOM TOOTH EXTRACTION         Social History:    Social History     Tobacco Use    Smoking status: Never Smoker    Smokeless tobacco: Never Used   Substance Use Topics    Alcohol use: Yes     Comment: occassional beer                                Counseling given: Not Answered      Vital Signs (Current):   Vitals:    22 1545 22 1106   BP:  (!) 145/79   Pulse:  86   Resp:  16   Temp:  99.6 °F (37.6 °C)   TempSrc:  Oral   SpO2:  100%   Weight: 230 lb (104.3 kg) 244 lb (110.7 kg)   Height: 5' 7\" (1.702 m) 5' 7\" (1.702 m)                                              BP Readings from Last 3 Encounters:   22 (!) 145/79   22 120/70   22 (!) 142/88       NPO Status: Time of last liquid consumption: 0000                        Time of last solid consumption: 0000 Date of last liquid consumption: 06/30/22                        Date of last solid food consumption: 06/30/22    BMI:   Wt Readings from Last 3 Encounters:   07/01/22 244 lb (110.7 kg)   06/14/22 244 lb (110.7 kg)   05/12/22 237 lb 9.6 oz (107.8 kg)     Body mass index is 38.22 kg/m². CBC:   Lab Results   Component Value Date/Time    HGB 15.8 07/01/2022 11:13 AM       CMP: No results found for: NA, K, CL, CO2, BUN, CREATININE, GFRAA, AGRATIO, LABGLOM, GLUCOSE, GLU, PROT, CALCIUM, BILITOT, ALKPHOS, AST, ALT    POC Tests: No results for input(s): POCGLU, POCNA, POCK, POCCL, POCBUN, POCHEMO, POCHCT in the last 72 hours. Coags: No results found for: PROTIME, INR, APTT    HCG (If Applicable):   Lab Results   Component Value Date    PREGTESTUR Negative 07/01/2022        ABGs: No results found for: PHART, PO2ART, VTT8GXF, CWQ6JMG, BEART, P9PEEYDS     Type & Screen (If Applicable):  No results found for: LABABO, LABRH    Drug/Infectious Status (If Applicable):  No results found for: HIV, HEPCAB    COVID-19 Screening (If Applicable): No results found for: COVID19        Anesthesia Evaluation  Patient summary reviewed  Airway: Mallampati: II  TM distance: >3 FB   Neck ROM: full  Mouth opening: > = 3 FB   Dental:          Pulmonary:Negative Pulmonary ROS and normal exam                               Cardiovascular:Negative CV ROS  Exercise tolerance: good (>4 METS),                     Neuro/Psych:   Negative Neuro/Psych ROS              GI/Hepatic/Renal: Neg GI/Hepatic/Renal ROS            Endo/Other: Negative Endo/Other ROS                    Abdominal:             Vascular: negative vascular ROS. Other Findings:           Anesthesia Plan      general     ASA 1       Induction: intravenous. Anesthetic plan and risks discussed with patient and spouse.                         Nyasia Busby MD   7/1/2022

## 2022-07-01 NOTE — ANESTHESIA PROCEDURE NOTES
Airway  Date/Time: 7/1/2022 12:12 PM  Urgency: elective    Airway not difficult    General Information and Staff    Patient location during procedure: OR  Resident/CRNA: MEGAN Mendieta - CRNA    Indications and Patient Condition  Indications for airway management: anesthesia  Sedation level: deep  Preoxygenated: yes  Patient position: sniffing  Mask difficulty assessment: vent by bag mask    Final Airway Details  Final airway type: endotracheal airway      Successful airway: ETT  Cuffed: yes   Successful intubation technique: direct laryngoscopy  Endotracheal tube insertion site: oral  Blade: Blanca  Blade size: #4  ETT size (mm): 7.0  Cormack-Lehane Classification: grade I - full view of glottis  Inital cuff pressure (cm H2O): 6  Measured from: lips  ETT to lips (cm): 19

## 2022-07-02 NOTE — OP NOTE
[facility]  OPERATIVE REPORT    Name:  Mario Morrow  MR#:  [id number]  :  1990  ACCOUNT #:  [de-identified]  DATE OF SERVICE:  2022    PREOPERATIVE DIAGNOSES:  Cholelithiasis, cholecystitis. POSTOPERATIVE DIAGNOSES:  Cholelithiasis, acute cholecystitis with hydrops of the gallbladder. PROCEDURE PERFORMED:  Robotic-assisted cholecystectomy. SURGEON:  Ksenia Sheridan MD    ASSISTANT:  Che Sheffield    ANESTHESIA:  General endotracheal anesthesia with Dr. Susana Mccabe. COMPLICATIONS:  None. SPECIMENS REMOVED:  Gallbladder. IMPLANTS:  None. ESTIMATED BLOOD LOSS: 50 cc's    DRAINS:  None. HISTORY:  This is a 60-year-old female who was referred by Mark Jones, nurse practitioner, with gallbladder problems. She had an ultrasound which showed cholelithiasis. HIDA scan showed no filling of the gallbladder back on . She was eventually referred to my office. I scheduled her for surgery. She put off the surgery for 2-3 weeks as she was going on vacation. I went through procedure, risks of bleeding, infection, anesthesia, injury to the liver, biliary tree structures, small bowel, large bowel, stomach, pancreas, potential need to convert to an open procedure. I said we would attempt to do this with the robot and may be need to convert it to a laparoscopic or an open procedure. She was agreeable, signed a consent form. PROCEDURE:  The patient was seen in the preop area with her , then transported to room #11 at [address] [place] [address] Operating Room. She was placed on the operating room table where Dr. Susana Mccabe and the nurse anesthetist performed general endotracheal anesthesia. She received 2 g of Ancef as prophylactic antibiotic coverage. Sequential compression devices were placed and used throughout the procedure. The abdomen was prepped and draped in the usual sterile manner.   I came in the room, did our safety time-out identifying the patient, procedure, surgeon and her birth date of 1990. Once everyone agreed with the time-out, we began the procedure. I made an incision superior and slightly to the left of the umbilicus. Through this, I placed a 5-mm optical trocar with a 5-mm 0-degree scope in the trocar. I was able to go through the appropriate layers of the anterior abdominal wall until we entered the abdomen. The abdomen was then insufflated to 15 mmHg using carbon dioxide gas. I placed two 8-mm trocars in the right side of the abdomen slightly above the umbilicus, one in the right midclavicular line and one in the right anterior axillary line. An 8-mm trocar was placed in the left upper quadrant in the midclavicular line. The camera was switched to the AirSeal approach so we could change out the 5-mm to a 12-mm trocar. I would have used an 8-mm trocar but we had no 8 mm bags. Once all four robotic trocars were in placed, the robot was brought in. It was targeted with the camera port which was the one in the right midclavicular line and the arms were attached. The arm one was fenestrated bipolar grasper, two was a camera port, three was the scissors and arm four was the ProGrasp. Once all the instruments have been brought in and safely brought to the center, we watched each instrument come into the operative field. ProGrasp was used to grasp fundus of gallbladder and was tented up towards the right hemidiaphragm. It was locked in position. We then took down adhesions between the gallbladder and the surrounding tissue. We dissected the area of Calot's triangle. We isolated all the biliary tree structures. I clipped the cystic duct and cystic artery on the specimen's side twice with the robotic intuitive clip applier. We obtained a critical view of safety and then cut both of these structures with the robotic scissors using electrocautery.   Gallbladder was completely detached from the gallbladder fossa using electrocautery and a hook. At this point, we checked the gallbladder fossa. There was no bleeding or bile leak. There was no bleeding from the cystic artery stump. No bile leak from the cystic duct stump. There were two clips placed in both of those structures. The robotic portion of procedure was completed, removed all the instruments carefully and left ports in place. We had placed a bag into the abdominal cavity. Before stopping the robotic portion, the gallbladder was placed within the bag. The 12-mm bag was closed and left at the entrance to the third port one near the umbilicus. Robotic portion of procedure was completed. We used the camera to watch as I removed the port and the 12-mm bag containing the gallbladder. I had to enlarge the incision a little bit as there were so many gallstones within the gallbladder. Gallbladder was removed from peritoneal cavity and passed off to be sent to Pathology. I had to close the 12-mm trocar fascia with interrupted sutures of 0 Vicryl. The skin incisions were closed with subcutaneous suture of 4-0 Monocryl. I injected 30 mL of 0.5% Marcaine around the four incision sites. Mastisol and Steri-Strips were placed over the wounds. The patient tolerated the procedure well, was extubated, brought to recovery room in stable condition.       Tati García MD      [id number]  D:  07/01/2022 14:01  T:  07/01/2022 23:50  JOB #:  [id number]

## 2023-04-20 ENCOUNTER — OFFICE VISIT (OUTPATIENT)
Dept: OBGYN CLINIC | Age: 33
End: 2023-04-20
Payer: COMMERCIAL

## 2023-04-20 VITALS
DIASTOLIC BLOOD PRESSURE: 74 MMHG | SYSTOLIC BLOOD PRESSURE: 128 MMHG | BODY MASS INDEX: 37.73 KG/M2 | HEIGHT: 67 IN | WEIGHT: 240.4 LBS

## 2023-04-20 DIAGNOSIS — N83.202 CYST OF LEFT OVARY: ICD-10-CM

## 2023-04-20 PROCEDURE — 99214 OFFICE O/P EST MOD 30 MIN: CPT | Performed by: STUDENT IN AN ORGANIZED HEALTH CARE EDUCATION/TRAINING PROGRAM

## 2023-04-20 NOTE — PROGRESS NOTES
Bel Stephen (: 1990) is a 28 y.o. [id number], Established patient, here for evaluation of the following chief complaint(s):    F/u Lt ovarian cyst     HPI:  Note from D. Parley Homans, 23:   She began having low back pain 6 mo ago, now getting worse with an increase in bloating. She is experiencing some frequency and urgency with urination no pain. She note she has an aunt who had ovarian cancer and has displayed anxiety r/t the possibility this cyst could be related. She notes she is about to enter month long inpatient mental health treatment. Ultrasound findings from today 2023     GYN US performed secondary to LOV cyst   CX appears wnl with multiple nabothian cysts   Uterus is anteverted and heterogenous. Possibly hypertrophy noted at C section scar. Endo= 7.0 mm, no intracavitary masses visualized   ROV visualized TA only with follicles and wnl   LOV visualized best TA. Enlarged with thin septated cyst. Minimal, if any, ovarian tissue noted around cyst. Blood flow noted in periphery. Cyst measures 9.4 x 6.5 x 8.6 cm (prev 9.1x7. 3x7.5cm) No adnexal masses. Small amount of simple FF seen adjacent to LOV cyst.           ASSESSMENT/PLAN:  1. Cyst of left ovary  Overview:  23: LOV enlarged with thin septated cyst. Minimal, if any, ovarian tissue noted around cyst. Blood flow noted in periphery. Cyst measures 9.4 x 6.5 x 8.6 cm (prev 9.1x7. 3x7.5cm). Cyst present since 2022, unchanged. PSHx: 2 c-sections, robotic lap es. PMHx: fatty liver disease. Desires surgical removal. Will obtain CA-125. Orders:  -            No follow-ups on file.     OBJECTIVE:  /74   Ht 5' 7\" (1.702 m)   Wt 240 lb 6.4 oz (109 kg)   LMP 2023 (Exact Date)   BMI 37.65 kg/m²      Past Medical History:   Diagnosis Date    Anxiety and depression     Biliary dyskinesia     Erythrocytosis 2017    Essential hypertension     no current meds- resolved with weight loss per pt

## 2023-04-21 LAB — CANCER AG125 SERPL-ACNC: 7 U/ML (ref 1.5–35)

## 2023-04-27 ENCOUNTER — CLINICAL DOCUMENTATION (OUTPATIENT)
Dept: OBGYN CLINIC | Age: 33
End: 2023-04-27

## 2023-04-28 ENCOUNTER — PREP FOR PROCEDURE (OUTPATIENT)
Dept: OBGYN CLINIC | Age: 33
End: 2023-04-28

## 2023-05-10 ENCOUNTER — PATIENT MESSAGE (OUTPATIENT)
Dept: OBGYN CLINIC | Age: 33
End: 2023-05-10

## 2023-05-10 NOTE — TELEPHONE ENCOUNTER
Scheduling:   Bel I have scheduled your surgery with Dr Kendal Hannah for 6/9/23 . Your surgery will be performed at [place] [address]. You have a pre-op with your doctor on 6/7/23 at 3:00p. You have a post-op appointment with your surgeon on 6/26/23 at 1:45p. If you have any questions regarding these appointments please feel free to contact me. Pre-Assessment by phone:  A pre-assessment nurse will call you 1 week before your surgery date to obtain your medical information in preparation for your surgery. The pre-assessment call is important to ensure your safety during surgery. If you miss their call they will leave a message with a phone call for you to call back. Day before surgery: You will be contacted by the hospital the day before your surgery and instructed on what time to arrive, what entrance to use and any specific details you need to be aware of. COVID Testing:  COVID testing is longer required prior to surgery but you will be asked about any recent exposure or positive testing when you receive your pre-assessment call from the hospital.  Pt with exposure will be asked to home test 5 days after exposure and those with a recent positive test will be reschedule 4-7 weeks after positive test depending on severity of symptoms. Insurance: Your insurance will be contacted and a pre-authorization will be obtained if required by the insurance company. This authorization is only for the surgeons portion. The hospital will call for the authorization for hospital charges. Surgical deposit: This amount is due before your surgery date. You can pay by phone or in person. Insurance information:  [employer] member # [id number], Co ins 10%, deductible $150.00, Met $0.00, surgery deposit due $0.00.      [facility]  Surgery Scheduler  Leeann MARCOS  [phone] ext [phone]

## 2023-06-08 ENCOUNTER — TELEPHONE (OUTPATIENT)
Dept: OBGYN CLINIC | Age: 33
End: 2023-06-08

## 2023-06-08 NOTE — TELEPHONE ENCOUNTER
Reached out to pt per  - pt no showed pre op appt yesterday for surgery scheduled tomorrow 6/9/23. Pt did not answer the phone so LVM for pt reminding up upcoming pre op appt today at 2:30 and notified pt that if she does not come to appt today she will not have surgery tomorrow. Encouraged to call back w/ any questions.

## 2023-08-29 ENCOUNTER — OFFICE VISIT (OUTPATIENT)
Dept: OBGYN CLINIC | Age: 33
End: 2023-08-29
Payer: COMMERCIAL

## 2023-08-29 VITALS — SYSTOLIC BLOOD PRESSURE: 120 MMHG | WEIGHT: 245 LBS | BODY MASS INDEX: 38.37 KG/M2 | DIASTOLIC BLOOD PRESSURE: 82 MMHG

## 2023-08-29 DIAGNOSIS — N83.202 CYST OF LEFT OVARY: ICD-10-CM

## 2023-08-29 PROCEDURE — 99213 OFFICE O/P EST LOW 20 MIN: CPT | Performed by: STUDENT IN AN ORGANIZED HEALTH CARE EDUCATION/TRAINING PROGRAM

## 2023-08-29 NOTE — PROGRESS NOTES
Activity: Not on file   Stress: Not on file   Social Connections: Not on file   Intimate Partner Violence: Not on file   Housing Stability: Not on file      Past Surgical History:   Procedure Laterality Date     DELIVERY ONLY      x2    CHOLECYSTECTOMY, LAPAROSCOPIC N/A 2022    CHOLECYSTECTOMY LAPAROSCOPIC ROBOTIC performed by Bharathi Miller MD at [facility] MAIN OR    WISDOM TOOTH EXTRACTION        OB History    Para Term  AB Living   3 2 2   1 2   SAB IAB Ectopic Molar Multiple Live Births   1         2      # Outcome Date GA Lbr Reuben/2nd Weight Sex Delivery Anes PTL Lv   3 Term 03/15/16 39w3d  8 lb 6.4 oz (3.81 kg) F CS-LTranv  N JOANNE   2 Term 12    F CS-LTranv   JOANNE   1 SAB                   Review of Systems   All other systems reviewed and are negative. Physical Exam  Constitutional:       General: She is not in acute distress. Appearance: Normal appearance. She is not ill-appearing. HENT:      Head: Normocephalic and atraumatic. Nose: Nose normal.   Eyes:      Extraocular Movements: Extraocular movements intact. Conjunctiva/sclera: Conjunctivae normal.   Pulmonary:      Effort: Pulmonary effort is normal. No respiratory distress. Abdominal:      Palpations: Abdomen is soft. Musculoskeletal:         General: Normal range of motion. Cervical back: Normal range of motion. Neurological:      General: No focal deficit present. Mental Status: She is alert and oriented to person, place, and time. Skin:     General: Skin is warm and dry. Psychiatric:         Mood and Affect: Mood normal.         Behavior: Behavior normal.          On this date 2023 I have spent 30 minutes reviewing previous notes, test results and face to face with the patient discussing the diagnosis and importance of compliance with the treatment plan as well as documenting on the day of the visit. An electronic signature was used to authenticate this note.     --Eleazar Perez

## 2023-09-06 NOTE — PERIOP NOTE
Patient verified name and . Order for consent not found in EHR     Type 2 surgery, PAT assessment complete. Orders not received. Labs per surgeon: None  Labs per anesthesia protocol: Hgb  DOS    Patient answered medical/surgical history questions at their best of ability. All prior to admission medications documented in EPIC. Patient instructed to take the following medications the day of surgery according to anesthesia guidelines with a small sip of water: Fluoxetine and Lamotrigine    Hold all vitamins 7 days prior to surgery and NSAIDS 5 days prior to surgery. Prescription meds to hold:None    Sent staff message and left voicemail for Trista Duron informing her pt states she tested positive for flu 2023 and according to anesthesia guidelines elective surgery would have to be postponed for 4 weeks and next DOS would be 2023. PAT charge nurse to follow up  Patient instructed on the following:    > Arrive at A Entrance, time of arrival to be called the day before by 1700  > NPO after midnight, unless otherwise indicated, including gum, mints, and ice chips  > Responsible adult must drive patient to the hospital, stay during surgery, and patient will need supervision 24 hours after anesthesia  > Use antibacterial soap in shower the night before surgery and on the morning of surgery  > All piercings must be removed prior to arrival.    > Leave all valuables (money and jewelry) at home but bring insurance card and ID on DOS.   > You may be required to pay a deductible or co-pay on the day of your procedure. You can pre-pay by calling [phone] if your surgery is at the [facility] or [phone] if your surgery is at the [facility]. > Do not wear make-up, nail polish, lotions, cologne, perfumes, powders, or oil on skin. Artificial nails are not permitted.

## 2023-09-06 NOTE — PERIOP NOTE
Patient was found to have a recent positive flu history, the below timetable is used for the earliest OR date for their elective procedure. Patient states had  respiratory symptoms including cough and/or dyspnea, was not hospitalized and not in the ICU. The date of the test was 09/01/2023. The earliest OR date is 09/29/2023. Respiratory Symptoms Hospitalized Need for ICU Earliest OR Date   No No No 4 weeks from test date   Yes No No 6 weeks from test date   Yes Yes No 8 weeks from date of discharge   Yes Yes Yes 12 weeks from date of discharge       The surgeon office will be notified with the above recommendation.

## 2023-09-12 ENCOUNTER — PREP FOR PROCEDURE (OUTPATIENT)
Dept: OBGYN CLINIC | Age: 33
End: 2023-09-12

## 2023-09-12 DIAGNOSIS — N83.202 CYST OF OVARY, LEFT: ICD-10-CM

## 2023-09-12 DIAGNOSIS — N83.202 CYST OF LEFT OVARY: Primary | ICD-10-CM

## 2023-10-03 NOTE — PROGRESS NOTES
Preop Visit    Ms. Janet [facility] presents for a preop visit. She is scheduled for a LEFT OOPHORECTOMY LAPAROSCOPIC on 10/13/23. Her history, meds, and allergies were reviewed. The procedure was reviewed in detail as well as the risks of bleeding, infection, DVT and potential surgical complications involving the bladder, ureters, colon or intestines. Also the alternatives,  benefits, recovery and follow-up. Prevention of SSI discussed as indicated. All of her questions were answered. Exam:  Lungs CTAB  Heart RRR    See the hospital H&P as well as prior chart for details.

## 2023-10-05 ENCOUNTER — OFFICE VISIT (OUTPATIENT)
Dept: OBGYN CLINIC | Age: 33
End: 2023-10-05

## 2023-10-05 VITALS
BODY MASS INDEX: 37.61 KG/M2 | SYSTOLIC BLOOD PRESSURE: 126 MMHG | WEIGHT: 239.6 LBS | DIASTOLIC BLOOD PRESSURE: 82 MMHG | HEIGHT: 67 IN

## 2023-10-05 DIAGNOSIS — N83.202 CYST OF LEFT OVARY: Primary | ICD-10-CM

## 2023-10-05 RX ORDER — ONDANSETRON 4 MG/1
4 TABLET, FILM COATED ORAL DAILY PRN
Qty: 30 TABLET | Refills: 0 | Status: SHIPPED | OUTPATIENT
Start: 2023-10-05

## 2023-10-05 RX ORDER — SODIUM CHLORIDE 0.9 % (FLUSH) 0.9 %
5-40 SYRINGE (ML) INJECTION EVERY 12 HOURS SCHEDULED
Status: CANCELLED | OUTPATIENT
Start: 2023-10-05

## 2023-10-05 RX ORDER — OXYCODONE HYDROCHLORIDE 5 MG/1
5 TABLET ORAL EVERY 6 HOURS PRN
Qty: 20 TABLET | Refills: 0 | Status: SHIPPED | OUTPATIENT
Start: 2023-10-05 | End: 2023-10-12

## 2023-10-05 RX ORDER — IBUPROFEN 800 MG/1
800 TABLET ORAL EVERY 8 HOURS PRN
Qty: 90 TABLET | Refills: 0 | Status: SHIPPED | OUTPATIENT
Start: 2023-10-05

## 2023-10-05 RX ORDER — SODIUM CHLORIDE 9 MG/ML
INJECTION, SOLUTION INTRAVENOUS PRN
Status: CANCELLED | OUTPATIENT
Start: 2023-10-05

## 2023-10-05 RX ORDER — SODIUM CHLORIDE 0.9 % (FLUSH) 0.9 %
5-40 SYRINGE (ML) INJECTION PRN
Status: CANCELLED | OUTPATIENT
Start: 2023-10-05

## 2023-10-05 NOTE — H&P (VIEW-ONLY)
Gynecology History and Physical    Name: America Chavez MRN: [id number] [id number]: [id number]    YOB: 1990  Age: 35 y.o. Sex: female       Subjective:      Chief complaint:  Left Ovarian cyst  and Pelvic pain    Delroy Oakley is a 35 y.o.  female with a history of left ovarian cyst  and pelvic pain. She is admitted for surgical removal of left ovary and cyst.    OB History          3    Para   2    Term   2            AB   1    Living   2         SAB   1    IAB        Ectopic        Molar        Multiple        Live Births   2              Past Medical History:   Diagnosis Date    Anxiety and depression     Biliary dyskinesia     Bipolar disorder, current episode depressed, moderate (HCC)     BMI 36.0-36.9,adult     Chronic post-traumatic stress disorder (PTSD)     Erythrocytosis 2017    Essential hypertension     no current treatment; \"white coat syndrome\"    Fatty liver     GDM (gestational diabetes mellitus)  &     no problems after pregnancy    Hyperlipidemia     no treatment    Left ventricular systolic dysfunction without heart failure 2017    Echo (17)=Ejection fraction was estimated in the range of 40 % to 45 %. Left ventricle: The ventricle was moderately dilated. Systolic function was mildly reduced. Marijuana use     2 times per week    OCD (obsessive compulsive disorder)     Vitamin D deficiency      Past Surgical History:   Procedure Laterality Date     DELIVERY ONLY      x2    CHOLECYSTECTOMY, LAPAROSCOPIC N/A 2022    CHOLECYSTECTOMY LAPAROSCOPIC ROBOTIC performed by Paula Doherty MD at [address] [place] [address]       Social History     Occupational History    Not on file   Tobacco Use    Smoking status: Never    Smokeless tobacco: Never   Vaping Use    Vaping Use: Never used   Substance and Sexual Activity    Alcohol use:  Yes     Alcohol/week: 6.0 standard drinks of alcohol     Types: 6 Shots of liquor per week Cervical, supraclavicular, and axillary nodes normal.   Neurologic: No focal deficits. Normal strength, sensation throughout. Assessment:   1. Cyst of left ovary  Overview:  4/20/23: LOV enlarged with thin septated cyst. Minimal, if any, ovarian tissue noted around cyst. Blood flow noted in periphery. Cyst measures 9.4 x 6.5 x 8.6 cm (prev 9.1x7. 3x7.5cm). Cyst present since 4/2022, unchanged. PSHx: 2 c-sections, robotic lap es. PMHx: fatty liver disease. Desires surgical removal. Normal CA-125. She does not desire future child-bearing. Plan for laparoscopic left oopherectomy. 8/29/23: Initial surgery canceled due to social issues. Plan to reschedule surgery at earliest convenience. No changes to pt's medical or surgical hx.     10/4/23: second surgery canceled due to COVID+. Rescheduled for next week. Plan:     Laparoscopic left oopherectomy. Discussed the risks of surgery including the risks of bleeding, infection, deep vein thrombosis, and surgical injuries to internal organs including but not limited to the bowels, bladder, rectum, and female reproductive organs. The patient understands the risks; any and all questions were answered to the patient's satisfaction.         India Arroyo MD  [facility]

## 2023-10-05 NOTE — H&P
Gynecology History and Physical    Name: Priya Hua MRN: [id number] [id number]: [id number]    YOB: 1990  Age: 35 y.o. Sex: female       Subjective:      Chief complaint:  Left Ovarian cyst  and Pelvic pain    Doc Feliciano is a 35 y.o.  female with a history of left ovarian cyst  and pelvic pain. She is admitted for surgical removal of left ovary and cyst.    OB History          3    Para   2    Term   2            AB   1    Living   2         SAB   1    IAB        Ectopic        Molar        Multiple        Live Births   2              Past Medical History:   Diagnosis Date    Anxiety and depression     Biliary dyskinesia     Bipolar disorder, current episode depressed, moderate (HCC)     BMI 36.0-36.9,adult     Chronic post-traumatic stress disorder (PTSD)     Erythrocytosis 2017    Essential hypertension     no current treatment; \"white coat syndrome\"    Fatty liver     GDM (gestational diabetes mellitus)  &     no problems after pregnancy    Hyperlipidemia     no treatment    Left ventricular systolic dysfunction without heart failure 2017    Echo (17)=Ejection fraction was estimated in the range of 40 % to 45 %. Left ventricle: The ventricle was moderately dilated. Systolic function was mildly reduced. Marijuana use     2 times per week    OCD (obsessive compulsive disorder)     Vitamin D deficiency      Past Surgical History:   Procedure Laterality Date     DELIVERY ONLY      x2    CHOLECYSTECTOMY, LAPAROSCOPIC N/A 2022    CHOLECYSTECTOMY LAPAROSCOPIC ROBOTIC performed by Bharathi Miller MD at [address] [place] [address]       Social History     Occupational History    Not on file   Tobacco Use    Smoking status: Never    Smokeless tobacco: Never   Vaping Use    Vaping Use: Never used   Substance and Sexual Activity    Alcohol use:  Yes     Alcohol/week: 6.0 standard drinks of alcohol     Types: 6 Shots of liquor per week

## 2023-10-06 PROBLEM — N83.202 CYST OF OVARY, LEFT: Status: ACTIVE | Noted: 2023-09-12

## 2023-10-10 NOTE — PROGRESS NOTES
Patient verified name and . Order for consent is found in EHR and matches case posting; patient verifies procedure. Type 2 surgery, Phone assessment complete. Orders were received. Labs per surgeon: none  Labs per anesthesia protocol: poc hgb    Patient answered medical/surgical history questions at their best of ability. All prior to admission medications documented in EPIC. Patient instructed to take the following medications the day of surgery according to anesthesia guidelines with a small sip of water: none  Hold all vitamins 7 days prior to surgery and NSAIDS 5 days prior to surgery. Prescription meds to hold:none  Patient instructed on the following:    > Arrive at [address] [place] [address], time of arrival to be called the day before by 1700  > NPO after midnight, unless otherwise indicated, including gum, mints, and ice chips  > Responsible adult must drive patient to the hospital, stay during surgery, and patient will need supervision 24 hours after anesthesia  > Use antibacterial soap in shower the night before surgery and on the morning of surgery  > All piercings must be removed prior to arrival.    > Leave all valuables (money and jewelry) at home but bring insurance card and ID on DOS.   > Do not wear make-up, nail polish, lotions, cologne, perfumes, powders, or oil on skin. Artificial nails are not permitted.

## 2023-10-12 ENCOUNTER — ANESTHESIA EVENT (OUTPATIENT)
Dept: SURGERY | Age: 33
End: 2023-10-12
Payer: COMMERCIAL

## 2023-10-13 ENCOUNTER — ANESTHESIA (OUTPATIENT)
Dept: SURGERY | Age: 33
End: 2023-10-13
Payer: COMMERCIAL

## 2023-10-13 ENCOUNTER — HOSPITAL ENCOUNTER (OUTPATIENT)
Age: 33
Setting detail: OUTPATIENT SURGERY
Discharge: HOME | End: 2023-10-13
Attending: STUDENT IN AN ORGANIZED HEALTH CARE EDUCATION/TRAINING PROGRAM | Admitting: STUDENT IN AN ORGANIZED HEALTH CARE EDUCATION/TRAINING PROGRAM
Payer: COMMERCIAL

## 2023-10-13 VITALS
OXYGEN SATURATION: 96 % | HEART RATE: 59 BPM | RESPIRATION RATE: 16 BRPM | HEIGHT: 67 IN | WEIGHT: 241.4 LBS | BODY MASS INDEX: 37.89 KG/M2 | TEMPERATURE: 98.5 F | DIASTOLIC BLOOD PRESSURE: 70 MMHG | SYSTOLIC BLOOD PRESSURE: 111 MMHG

## 2023-10-13 LAB
ABO + RH BLD: NORMAL
BLOOD GROUP ANTIBODIES SERPL: NORMAL
ERYTHROCYTE [DISTWIDTH] IN BLOOD BY AUTOMATED COUNT: 12.7 % (ref 11.9–14.6)
HCG UR QL: NEGATIVE
HCT VFR BLD AUTO: 44.2 % (ref 35.8–46.3)
HGB BLD-MCNC: 14.8 G/DL (ref 11.7–15.4)
MCH RBC QN AUTO: 29.2 PG (ref 26.1–32.9)
MCHC RBC AUTO-ENTMCNC: 33.5 G/DL (ref 31.4–35)
MCV RBC AUTO: 87.4 FL (ref 82–102)
NRBC # BLD: 0 K/UL (ref 0–0.2)
PLATELET # BLD AUTO: 250 K/UL (ref 150–450)
PMV BLD AUTO: 9.9 FL (ref 9.4–12.3)
RBC # BLD AUTO: 5.06 M/UL (ref 4.05–5.2)
SPECIMEN EXP DATE BLD: NORMAL
WBC # BLD AUTO: 7.2 K/UL (ref 4.3–11.1)

## 2023-10-13 PROCEDURE — 7100000010 HC PHASE II RECOVERY - FIRST 15 MIN: Performed by: STUDENT IN AN ORGANIZED HEALTH CARE EDUCATION/TRAINING PROGRAM

## 2023-10-13 PROCEDURE — 88305 TISSUE EXAM BY PATHOLOGIST: CPT

## 2023-10-13 PROCEDURE — 2709999900 HC NON-CHARGEABLE SUPPLY: Performed by: STUDENT IN AN ORGANIZED HEALTH CARE EDUCATION/TRAINING PROGRAM

## 2023-10-13 PROCEDURE — 85027 COMPLETE CBC AUTOMATED: CPT

## 2023-10-13 PROCEDURE — 3700000000 HC ANESTHESIA ATTENDED CARE: Performed by: STUDENT IN AN ORGANIZED HEALTH CARE EDUCATION/TRAINING PROGRAM

## 2023-10-13 PROCEDURE — 6360000002 HC RX W HCPCS: Performed by: NURSE ANESTHETIST, CERTIFIED REGISTERED

## 2023-10-13 PROCEDURE — 7100000000 HC PACU RECOVERY - FIRST 15 MIN: Performed by: STUDENT IN AN ORGANIZED HEALTH CARE EDUCATION/TRAINING PROGRAM

## 2023-10-13 PROCEDURE — 86901 BLOOD TYPING SEROLOGIC RH(D): CPT

## 2023-10-13 PROCEDURE — 6370000000 HC RX 637 (ALT 250 FOR IP): Performed by: STUDENT IN AN ORGANIZED HEALTH CARE EDUCATION/TRAINING PROGRAM

## 2023-10-13 PROCEDURE — 3600000014 HC SURGERY LEVEL 4 ADDTL 15MIN: Performed by: STUDENT IN AN ORGANIZED HEALTH CARE EDUCATION/TRAINING PROGRAM

## 2023-10-13 PROCEDURE — 7100000001 HC PACU RECOVERY - ADDTL 15 MIN: Performed by: STUDENT IN AN ORGANIZED HEALTH CARE EDUCATION/TRAINING PROGRAM

## 2023-10-13 PROCEDURE — 6360000002 HC RX W HCPCS: Performed by: STUDENT IN AN ORGANIZED HEALTH CARE EDUCATION/TRAINING PROGRAM

## 2023-10-13 PROCEDURE — 2720000010 HC SURG SUPPLY STERILE: Performed by: STUDENT IN AN ORGANIZED HEALTH CARE EDUCATION/TRAINING PROGRAM

## 2023-10-13 PROCEDURE — 86850 RBC ANTIBODY SCREEN: CPT

## 2023-10-13 PROCEDURE — 58661 LAPAROSCOPY REMOVE ADNEXA: CPT | Performed by: OBSTETRICS & GYNECOLOGY

## 2023-10-13 PROCEDURE — 58661 LAPAROSCOPY REMOVE ADNEXA: CPT | Performed by: STUDENT IN AN ORGANIZED HEALTH CARE EDUCATION/TRAINING PROGRAM

## 2023-10-13 PROCEDURE — 86900 BLOOD TYPING SEROLOGIC ABO: CPT

## 2023-10-13 PROCEDURE — 2500000003 HC RX 250 WO HCPCS: Performed by: NURSE ANESTHETIST, CERTIFIED REGISTERED

## 2023-10-13 PROCEDURE — 2580000003 HC RX 258: Performed by: STUDENT IN AN ORGANIZED HEALTH CARE EDUCATION/TRAINING PROGRAM

## 2023-10-13 PROCEDURE — 3600000004 HC SURGERY LEVEL 4 BASE: Performed by: STUDENT IN AN ORGANIZED HEALTH CARE EDUCATION/TRAINING PROGRAM

## 2023-10-13 PROCEDURE — 88307 TISSUE EXAM BY PATHOLOGIST: CPT

## 2023-10-13 PROCEDURE — 81025 URINE PREGNANCY TEST: CPT

## 2023-10-13 PROCEDURE — 3700000001 HC ADD 15 MINUTES (ANESTHESIA): Performed by: STUDENT IN AN ORGANIZED HEALTH CARE EDUCATION/TRAINING PROGRAM

## 2023-10-13 PROCEDURE — 7100000011 HC PHASE II RECOVERY - ADDTL 15 MIN: Performed by: STUDENT IN AN ORGANIZED HEALTH CARE EDUCATION/TRAINING PROGRAM

## 2023-10-13 RX ORDER — OXYCODONE HYDROCHLORIDE 5 MG/1
10 TABLET ORAL PRN
Status: COMPLETED | OUTPATIENT
Start: 2023-10-13 | End: 2023-10-13

## 2023-10-13 RX ORDER — KETAMINE HYDROCHLORIDE 50 MG/ML
INJECTION, SOLUTION, CONCENTRATE INTRAMUSCULAR; INTRAVENOUS PRN
Status: DISCONTINUED | OUTPATIENT
Start: 2023-10-13 | End: 2023-10-13 | Stop reason: SDUPTHER

## 2023-10-13 RX ORDER — DIPHENHYDRAMINE HYDROCHLORIDE 50 MG/ML
12.5 INJECTION INTRAMUSCULAR; INTRAVENOUS
Status: DISCONTINUED | OUTPATIENT
Start: 2023-10-13 | End: 2023-10-13 | Stop reason: HOSPADM

## 2023-10-13 RX ORDER — HYDRALAZINE HYDROCHLORIDE 20 MG/ML
10 INJECTION INTRAMUSCULAR; INTRAVENOUS
Status: DISCONTINUED | OUTPATIENT
Start: 2023-10-13 | End: 2023-10-13 | Stop reason: HOSPADM

## 2023-10-13 RX ORDER — FENTANYL CITRATE 50 UG/ML
25 INJECTION, SOLUTION INTRAMUSCULAR; INTRAVENOUS
Status: DISCONTINUED | OUTPATIENT
Start: 2023-10-13 | End: 2023-10-13 | Stop reason: HOSPADM

## 2023-10-13 RX ORDER — LABETALOL HYDROCHLORIDE 5 MG/ML
10 INJECTION, SOLUTION INTRAVENOUS
Status: DISCONTINUED | OUTPATIENT
Start: 2023-10-13 | End: 2023-10-13 | Stop reason: HOSPADM

## 2023-10-13 RX ORDER — PROCHLORPERAZINE EDISYLATE 5 MG/ML
5 INJECTION INTRAMUSCULAR; INTRAVENOUS
Status: DISCONTINUED | OUTPATIENT
Start: 2023-10-13 | End: 2023-10-13 | Stop reason: HOSPADM

## 2023-10-13 RX ORDER — SODIUM CHLORIDE 0.9 % (FLUSH) 0.9 %
5-40 SYRINGE (ML) INJECTION EVERY 12 HOURS SCHEDULED
Status: DISCONTINUED | OUTPATIENT
Start: 2023-10-13 | End: 2023-10-13 | Stop reason: HOSPADM

## 2023-10-13 RX ORDER — FENTANYL CITRATE 50 UG/ML
INJECTION, SOLUTION INTRAMUSCULAR; INTRAVENOUS PRN
Status: DISCONTINUED | OUTPATIENT
Start: 2023-10-13 | End: 2023-10-13 | Stop reason: SDUPTHER

## 2023-10-13 RX ORDER — OXYCODONE HYDROCHLORIDE 5 MG/1
5 TABLET ORAL PRN
Status: COMPLETED | OUTPATIENT
Start: 2023-10-13 | End: 2023-10-13

## 2023-10-13 RX ORDER — SODIUM CHLORIDE 9 MG/ML
INJECTION, SOLUTION INTRAVENOUS PRN
Status: DISCONTINUED | OUTPATIENT
Start: 2023-10-13 | End: 2023-10-13 | Stop reason: HOSPADM

## 2023-10-13 RX ORDER — SODIUM CHLORIDE, SODIUM LACTATE, POTASSIUM CHLORIDE, CALCIUM CHLORIDE 600; 310; 30; 20 MG/100ML; MG/100ML; MG/100ML; MG/100ML
INJECTION, SOLUTION INTRAVENOUS CONTINUOUS
Status: DISCONTINUED | OUTPATIENT
Start: 2023-10-13 | End: 2023-10-13 | Stop reason: HOSPADM

## 2023-10-13 RX ORDER — SODIUM CHLORIDE 9 MG/ML
INJECTION, SOLUTION INTRAVENOUS PRN
Status: DISCONTINUED | OUTPATIENT
Start: 2023-10-13 | End: 2023-10-13

## 2023-10-13 RX ORDER — IPRATROPIUM BROMIDE AND ALBUTEROL SULFATE 2.5; .5 MG/3ML; MG/3ML
1 SOLUTION RESPIRATORY (INHALATION)
Status: DISCONTINUED | OUTPATIENT
Start: 2023-10-13 | End: 2023-10-13 | Stop reason: HOSPADM

## 2023-10-13 RX ORDER — SODIUM CHLORIDE 0.9 % (FLUSH) 0.9 %
5-40 SYRINGE (ML) INJECTION PRN
Status: DISCONTINUED | OUTPATIENT
Start: 2023-10-13 | End: 2023-10-13 | Stop reason: HOSPADM

## 2023-10-13 RX ORDER — DEXAMETHASONE SODIUM PHOSPHATE 10 MG/ML
INJECTION INTRAMUSCULAR; INTRAVENOUS PRN
Status: DISCONTINUED | OUTPATIENT
Start: 2023-10-13 | End: 2023-10-13 | Stop reason: SDUPTHER

## 2023-10-13 RX ORDER — FENTANYL CITRATE 50 UG/ML
100 INJECTION, SOLUTION INTRAMUSCULAR; INTRAVENOUS
Status: DISCONTINUED | OUTPATIENT
Start: 2023-10-13 | End: 2023-10-13 | Stop reason: HOSPADM

## 2023-10-13 RX ORDER — HALOPERIDOL 5 MG/ML
1 INJECTION INTRAMUSCULAR
Status: DISCONTINUED | OUTPATIENT
Start: 2023-10-13 | End: 2023-10-13 | Stop reason: HOSPADM

## 2023-10-13 RX ORDER — NEOSTIGMINE METHYLSULFATE 1 MG/ML
INJECTION, SOLUTION INTRAVENOUS PRN
Status: DISCONTINUED | OUTPATIENT
Start: 2023-10-13 | End: 2023-10-13 | Stop reason: SDUPTHER

## 2023-10-13 RX ORDER — GLYCOPYRROLATE 0.2 MG/ML
INJECTION INTRAMUSCULAR; INTRAVENOUS PRN
Status: DISCONTINUED | OUTPATIENT
Start: 2023-10-13 | End: 2023-10-13 | Stop reason: SDUPTHER

## 2023-10-13 RX ORDER — ONDANSETRON 2 MG/ML
INJECTION INTRAMUSCULAR; INTRAVENOUS PRN
Status: DISCONTINUED | OUTPATIENT
Start: 2023-10-13 | End: 2023-10-13 | Stop reason: SDUPTHER

## 2023-10-13 RX ORDER — MIDAZOLAM HYDROCHLORIDE 2 MG/2ML
2 INJECTION, SOLUTION INTRAMUSCULAR; INTRAVENOUS
Status: COMPLETED | OUTPATIENT
Start: 2023-10-13 | End: 2023-10-13

## 2023-10-13 RX ORDER — LIDOCAINE HYDROCHLORIDE 10 MG/ML
1 INJECTION, SOLUTION INFILTRATION; PERINEURAL
Status: DISCONTINUED | OUTPATIENT
Start: 2023-10-13 | End: 2023-10-13 | Stop reason: HOSPADM

## 2023-10-13 RX ORDER — KETOROLAC TROMETHAMINE 30 MG/ML
INJECTION, SOLUTION INTRAMUSCULAR; INTRAVENOUS PRN
Status: DISCONTINUED | OUTPATIENT
Start: 2023-10-13 | End: 2023-10-13 | Stop reason: SDUPTHER

## 2023-10-13 RX ORDER — SODIUM CHLORIDE 0.9 % (FLUSH) 0.9 %
5-40 SYRINGE (ML) INJECTION EVERY 12 HOURS SCHEDULED
Status: DISCONTINUED | OUTPATIENT
Start: 2023-10-13 | End: 2023-10-13

## 2023-10-13 RX ORDER — LIDOCAINE HYDROCHLORIDE 20 MG/ML
INJECTION, SOLUTION EPIDURAL; INFILTRATION; INTRACAUDAL; PERINEURAL PRN
Status: DISCONTINUED | OUTPATIENT
Start: 2023-10-13 | End: 2023-10-13 | Stop reason: SDUPTHER

## 2023-10-13 RX ORDER — SODIUM CHLORIDE 0.9 % (FLUSH) 0.9 %
5-40 SYRINGE (ML) INJECTION PRN
Status: DISCONTINUED | OUTPATIENT
Start: 2023-10-13 | End: 2023-10-13

## 2023-10-13 RX ORDER — PROPOFOL 10 MG/ML
INJECTION, EMULSION INTRAVENOUS PRN
Status: DISCONTINUED | OUTPATIENT
Start: 2023-10-13 | End: 2023-10-13 | Stop reason: SDUPTHER

## 2023-10-13 RX ORDER — ROCURONIUM BROMIDE 10 MG/ML
INJECTION, SOLUTION INTRAVENOUS PRN
Status: DISCONTINUED | OUTPATIENT
Start: 2023-10-13 | End: 2023-10-13 | Stop reason: SDUPTHER

## 2023-10-13 RX ORDER — SCOLOPAMINE TRANSDERMAL SYSTEM 1 MG/1
1 PATCH, EXTENDED RELEASE TRANSDERMAL
Status: DISCONTINUED | OUTPATIENT
Start: 2023-10-13 | End: 2023-10-13 | Stop reason: HOSPADM

## 2023-10-13 RX ADMIN — GLYCOPYRROLATE 0.2 MG: 0.2 INJECTION INTRAMUSCULAR; INTRAVENOUS at 10:44

## 2023-10-13 RX ADMIN — KETAMINE HYDROCHLORIDE 20 MG: 50 INJECTION, SOLUTION INTRAMUSCULAR; INTRAVENOUS at 10:31

## 2023-10-13 RX ADMIN — OXYCODONE HYDROCHLORIDE 5 MG: 5 TABLET ORAL at 12:26

## 2023-10-13 RX ADMIN — FENTANYL CITRATE 100 MCG: 50 INJECTION, SOLUTION INTRAMUSCULAR; INTRAVENOUS at 10:22

## 2023-10-13 RX ADMIN — LIDOCAINE HYDROCHLORIDE 100 MG: 20 INJECTION, SOLUTION EPIDURAL; INFILTRATION; INTRACAUDAL; PERINEURAL at 10:22

## 2023-10-13 RX ADMIN — PROPOFOL 250 MG: 10 INJECTION, EMULSION INTRAVENOUS at 10:22

## 2023-10-13 RX ADMIN — HYDROMORPHONE HYDROCHLORIDE 0.5 MG: 1 INJECTION, SOLUTION INTRAMUSCULAR; INTRAVENOUS; SUBCUTANEOUS at 11:58

## 2023-10-13 RX ADMIN — KETAMINE HYDROCHLORIDE 20 MG: 50 INJECTION, SOLUTION INTRAMUSCULAR; INTRAVENOUS at 11:13

## 2023-10-13 RX ADMIN — SODIUM CHLORIDE, SODIUM LACTATE, POTASSIUM CHLORIDE, AND CALCIUM CHLORIDE: 600; 310; 30; 20 INJECTION, SOLUTION INTRAVENOUS at 10:46

## 2023-10-13 RX ADMIN — ROCURONIUM BROMIDE 50 MG: 50 INJECTION, SOLUTION INTRAVENOUS at 10:22

## 2023-10-13 RX ADMIN — Medication 2 G: at 10:13

## 2023-10-13 RX ADMIN — KETOROLAC TROMETHAMINE 30 MG: 30 INJECTION, SOLUTION INTRAMUSCULAR; INTRAVENOUS at 11:27

## 2023-10-13 RX ADMIN — Medication 5 MG: at 11:27

## 2023-10-13 RX ADMIN — HYDROMORPHONE HYDROCHLORIDE 0.5 MG: 1 INJECTION, SOLUTION INTRAMUSCULAR; INTRAVENOUS; SUBCUTANEOUS at 12:05

## 2023-10-13 RX ADMIN — MIDAZOLAM 2 MG: 1 INJECTION INTRAMUSCULAR; INTRAVENOUS at 08:47

## 2023-10-13 RX ADMIN — GLYCOPYRROLATE 0.5 MG: 0.2 INJECTION INTRAMUSCULAR; INTRAVENOUS at 11:27

## 2023-10-13 RX ADMIN — ONDANSETRON 4 MG: 2 INJECTION INTRAMUSCULAR; INTRAVENOUS at 11:13

## 2023-10-13 RX ADMIN — SODIUM CHLORIDE, SODIUM LACTATE, POTASSIUM CHLORIDE, AND CALCIUM CHLORIDE: 600; 310; 30; 20 INJECTION, SOLUTION INTRAVENOUS at 08:35

## 2023-10-13 RX ADMIN — DEXAMETHASONE SODIUM PHOSPHATE 8 MG: 10 INJECTION INTRAMUSCULAR; INTRAVENOUS at 10:37

## 2023-10-13 ASSESSMENT — PAIN DESCRIPTION - PAIN TYPE: TYPE: ACUTE PAIN;SURGICAL PAIN

## 2023-10-13 ASSESSMENT — PAIN SCALES - GENERAL
PAINLEVEL_OUTOF10: 0
PAINLEVEL_OUTOF10: 3
PAINLEVEL_OUTOF10: 5
PAINLEVEL_OUTOF10: 5
PAINLEVEL_OUTOF10: 0
PAINLEVEL_OUTOF10: 4
PAINLEVEL_OUTOF10: 0

## 2023-10-13 ASSESSMENT — PAIN DESCRIPTION - DESCRIPTORS
DESCRIPTORS: SORE
DESCRIPTORS: SORE

## 2023-10-13 ASSESSMENT — PAIN DESCRIPTION - LOCATION
LOCATION: ABDOMEN;INCISION

## 2023-10-13 ASSESSMENT — PAIN - FUNCTIONAL ASSESSMENT: PAIN_FUNCTIONAL_ASSESSMENT: 0-10

## 2023-10-13 NOTE — OP NOTE
Operative Note      Patient: Corinne Beery  YOB: 1990  MRN: [id number]    Date of Procedure: 10/13/2023    Pre-Op Diagnosis Codes:     * Cyst of ovary, left [N83.202]    Post-Op Diagnosis: Same       Procedure(s):  LEFT OOPHORECTOMY LAPAROSCOPIC  Laparoscopic left salpingoophorectomy    Surgeon(s):  MD Elizabeth Mercado MD    Assistant:   * No surgical staff found *    Anesthesia: General    Estimated Blood Loss (mL): Minimal    Complications: None    Specimens:   ID Type Source Tests Collected by Time Destination   A : left ovary and left fallopian tube Tissue Ovary SURGICAL PATHOLOGY Elizabeth Welsh MD 10/13/2023 1113        Implants:  * No implants in log *      Drains:   [REMOVED] Urinary Catheter 10/13/23 Lee (Removed)       Findings:   NEFG  Normal vagina and cervix  Uterus sounds to 12cm  Normal uterus and right ovary and fallopian tube  Left ovary with large 10cm simple-appearing cyst.    Dr Lisa Rosenbaum assistance was required due to the complexity of case. Assistant positioned the uterine manipulator and provided counter traction for dissection throughout the procedure. Detailed Description of Procedure:   After informed consent was obtained, patient was taken to the operating suite where she under general endotracheal anesthesia. She was prepped and draped in the normal sterile fashion in the supine position with Lee catheter in place and pneumatic compression hose on and operating in stirrups. A speculum was placed in the vagina. The uterus sounded to 12cm. The cervix was dilated to accommodate the uterine manipulator. A Zumi uterine manipulator was placed. Attention was then turned towards the abdomen. A scalpel was used to make an infraumbilical skin incision. Veress needle was placed into this incision and placed with intraabdominal position verified with the water drop test.  The carbon dioxide gas was hooked with opening pressure of -1 mmHg.   Abdomen was then insufflated to a pressure of approximately 15. The Veress needle  was removed and a  12-mm non bladed port was placed without difficulty. Intraabdominal position was then verified with the scope. Area below the insertion was felt to be within normal limits. Patient was then placed in Trendelenburg. The above findings were noted. A 5-mm nonbladed port was placed under direct visualization in the right lower quadrant. In a similar manner in the left lower quadrant, a 5-mm nonbladed port was placed, all under direct visualization. The left ureter was identified along its course from the pelvic brim towards the cul-de-sac. An Endoseal laparoscopic ligature device was used to ligate and cauterize the left uteroovarian blood vessels. Dissection continued along the mesosalpinx towards the ovarian vessels. The ovarian vessels were similarly ligated and cauterized, freeing the left ovary and fallopian tube from their attachment to the pelvic sidewall. Excellent hemostasis was noted. The specimen was placed in a 12cm Endocatch bag and removed from the umbilical port. The fascial incision was extended caudally to allow for removal of the specimen. Excellent hemostasis was again reassured. The ports were removed. The umbilical fascia was closed with 0-Vicryl running suture. 4-0 Monocryl was used to close the skin incisions. Steri-strips and Mastisol were applied over the incisions. The dean catheter and uterine manipulator were removed. Patient tolerated the procedure well. Sponge, lap, and needle counts were correct x2. The patient was taken to recovery in stable condition.     Electronically signed by Ilah Schlatter, MD on 10/13/2023 at 11:45 AM

## 2023-10-13 NOTE — INTERVAL H&P NOTE
Update History & Physical    The patient's History and Physical of October 13, 2023 was reviewed with the patient and I examined the patient. There was no change. The surgical site was confirmed by the patient and me. Plan: The risks, benefits, expected outcome, and alternative to the recommended procedure have been discussed with the patient. Patient understands and wants to proceed with the procedure.      Electronically signed by Elizabeth Welsh MD on 10/13/2023 at 9:41 AM

## 2023-10-13 NOTE — ANESTHESIA POSTPROCEDURE EVALUATION
Department of Anesthesiology  Postprocedure Note    Patient: Severiano Casas  MRN: [id number]  YOB: 1990  Date of evaluation: 10/13/2023      Procedure Summary     Date: 10/13/23 Room / Location: [facility]  / [facility]    Anesthesia Start: 1010 Anesthesia Stop: 1394    Procedure: LEFT OOPHORECTOMY LAPAROSCOPIC (Left) Diagnosis:       Cyst of ovary, left      (Cyst of ovary, left [N83.202])    Surgeons: Keyla Ayers MD Responsible Provider: Ismael Hatch MD    Anesthesia Type: general ASA Status: 3          Anesthesia Type: No value filed.     Lizeth Phase I: Lizeth Score: 10    Lizeth Phase II: Lizeth Score: 10      Anesthesia Post Evaluation    Patient location during evaluation: bedside  Patient participation: complete - patient participated  Level of consciousness: awake and alert  Airway patency: patent  Nausea & Vomiting: no vomiting  Complications: no  Cardiovascular status: hemodynamically stable  Respiratory status: acceptable  Hydration status: euvolemic  Pain management: adequate

## 2023-10-17 ENCOUNTER — PATIENT MESSAGE (OUTPATIENT)
Dept: OBGYN CLINIC | Age: 33
End: 2023-10-17

## 2023-10-17 DIAGNOSIS — B37.31 CANDIDA VAGINITIS: Primary | ICD-10-CM

## 2023-10-17 RX ORDER — FLUCONAZOLE 150 MG/1
150 TABLET ORAL
Qty: 2 TABLET | Refills: 0 | Status: SHIPPED | OUTPATIENT
Start: 2023-10-17 | End: 2023-10-23

## 2024-01-17 ENCOUNTER — APPOINTMENT (RX ONLY)
Dept: URBAN - METROPOLITAN AREA CLINIC 25 | Facility: CLINIC | Age: 34
Setting detail: DERMATOLOGY
End: 2024-01-17

## 2024-01-17 DIAGNOSIS — L57.8 OTHER SKIN CHANGES DUE TO CHRONIC EXPOSURE TO NONIONIZING RADIATION: ICD-10-CM

## 2024-01-17 DIAGNOSIS — I78.8 OTHER DISEASES OF CAPILLARIES: ICD-10-CM

## 2024-01-17 DIAGNOSIS — L71.8 OTHER ROSACEA: ICD-10-CM | Status: INADEQUATELY CONTROLLED

## 2024-01-17 DIAGNOSIS — D22 MELANOCYTIC NEVI: ICD-10-CM

## 2024-01-17 PROBLEM — D22.4 MELANOCYTIC NEVI OF SCALP AND NECK: Status: ACTIVE | Noted: 2024-01-17

## 2024-01-17 PROBLEM — D22.5 MELANOCYTIC NEVI OF TRUNK: Status: ACTIVE | Noted: 2024-01-17

## 2024-01-17 PROBLEM — D22.39 MELANOCYTIC NEVI OF OTHER PARTS OF FACE: Status: ACTIVE | Noted: 2024-01-17

## 2024-01-17 PROCEDURE — ? REFERRAL

## 2024-01-17 PROCEDURE — ? OTHER

## 2024-01-17 PROCEDURE — ? COUNSELING

## 2024-01-17 PROCEDURE — ? PRESCRIPTION MEDICATION MANAGEMENT

## 2024-01-17 PROCEDURE — ? PRESCRIPTION

## 2024-01-17 PROCEDURE — 99204 OFFICE O/P NEW MOD 45 MIN: CPT

## 2024-01-17 RX ORDER — OXYMETAZOLINE HYDROCHLORIDE 1 G/100G
CREAM TOPICAL
Qty: 30 | Refills: 3 | Status: ERX | COMMUNITY
Start: 2024-01-17

## 2024-01-17 RX ADMIN — OXYMETAZOLINE HYDROCHLORIDE: 1 CREAM TOPICAL at 00:00

## 2024-01-17 ASSESSMENT — LOCATION ZONE DERM
LOCATION ZONE: NOSE
LOCATION ZONE: FACE
LOCATION ZONE: NECK
LOCATION ZONE: TRUNK
LOCATION ZONE: ARM

## 2024-01-17 ASSESSMENT — LOCATION SIMPLE DESCRIPTION DERM
LOCATION SIMPLE: RIGHT CHEEK
LOCATION SIMPLE: NOSE
LOCATION SIMPLE: INFERIOR FOREHEAD
LOCATION SIMPLE: LEFT ANTERIOR NECK
LOCATION SIMPLE: LEFT CHEEK
LOCATION SIMPLE: CHIN
LOCATION SIMPLE: LEFT SHOULDER
LOCATION SIMPLE: RIGHT SHOULDER
LOCATION SIMPLE: UPPER BACK

## 2024-01-17 ASSESSMENT — LOCATION DETAILED DESCRIPTION DERM
LOCATION DETAILED: LEFT CENTRAL MALAR CHEEK
LOCATION DETAILED: LEFT CHIN
LOCATION DETAILED: INFERIOR THORACIC SPINE
LOCATION DETAILED: RIGHT POSTERIOR SHOULDER
LOCATION DETAILED: LEFT POSTERIOR SHOULDER
LOCATION DETAILED: RIGHT CENTRAL MALAR CHEEK
LOCATION DETAILED: LEFT MEDIAL MALAR CHEEK
LOCATION DETAILED: INFERIOR MID FOREHEAD
LOCATION DETAILED: NASAL TIP
LOCATION DETAILED: LEFT INFERIOR LATERAL NECK

## 2024-01-17 NOTE — PROCEDURE: OTHER
Other (Free Text): Refer to plastic surgery.
Note Text (......Xxx Chief Complaint.): This diagnosis correlates with the
Render Risk Assessment In Note?: no
Detail Level: Zone

## 2024-01-17 NOTE — PROCEDURE: PRESCRIPTION MEDICATION MANAGEMENT
Render In Strict Bullet Format?: No
Samples Given: Rhofade.
Detail Level: Zone
Initiate Treatment: Rhofade as directed.

## 2024-01-17 NOTE — HPI: EVALUATION OF SKIN LESION(S)
What Type Of Note Output Would You Prefer (Optional)?: Standard Output
Hpi Title: Evaluation of Skin Lesions
How Severe Are Your Spot(S)?: mild
Have Your Spot(S) Been Treated In The Past?: has not been treated
Additional History: Patient was seen at the emergency room for flushing on her face and was told that she likely has rosacea. She notes that her face will sometimes sting, but she denies having an issue with pustules and pimples.

## 2024-06-06 NOTE — PROGRESS NOTES
Bel Stephen (: 1990) is a 33 y.o. , Established patient, here for evaluation of the following chief complaint(s): discussion about PMDD     HPI:  Per pts MyChart message:   her period is 11 days late - took 4 preg tests all negative.   It has been speculated by her psychiatrist, though never officially diagnosed, that she has PMDD .  Has been having some pretty severe mental health issues for the past 2 weeks.    Per pt today -- her period ended up being 13 days late.       ASSESSMENT/PLAN:  1. PMDD (premenstrual dysphoric disorder)  Overview:  24: reports worse mood symptoms before and while on period for the past year. Her sxs are suicide ideation, dark place, irritable, exhaustion, shut down. She was admitted inpatient for a month 1 year ago and currently sees Eliza at [facility] who had her make a log of her sxs and that is when noticed her sxs are worse around her periods. She is bipolar, but notes suicidal episodes around period. She takes Fluvox and is in therapy. The medication helps her with intrusive thoughts. She also reports starting ADHD meds in 2023 and notes great improvement with this med. Reports periods are normally regular for the past year every 28d, but she was 13 days late with her last period. Prior to 1 year ago, had a regular cycle but it was longer 32d. Reports occasional bouts of late periods. She has never been on birth control. Her mother passed away from a stroke at 35yo.   Plan:  Not candidate for estrogen due to family h/o early stroke.  Discussed treatment with Nexplanon, DMPA, and POP. Pt would like to try Nexplanon. Message sent to coordinator.       No follow-ups on file.    OBJECTIVE:  /76   Wt 121 kg (266 lb 12.8 oz)   LMP 2024 (Exact Date)   BMI 42.42 kg/m²      Past Medical History:   Diagnosis Date    Anxiety and depression     Biliary dyskinesia     Bipolar disorder, current episode depressed,

## 2024-06-07 ENCOUNTER — OFFICE VISIT (OUTPATIENT)
Dept: OBGYN CLINIC | Age: 34
End: 2024-06-07
Payer: COMMERCIAL

## 2024-06-07 VITALS — WEIGHT: 266.8 LBS | BODY MASS INDEX: 42.42 KG/M2 | SYSTOLIC BLOOD PRESSURE: 122 MMHG | DIASTOLIC BLOOD PRESSURE: 76 MMHG

## 2024-06-07 DIAGNOSIS — F32.81 PMDD (PREMENSTRUAL DYSPHORIC DISORDER): Primary | ICD-10-CM

## 2024-06-07 PROCEDURE — 99214 OFFICE O/P EST MOD 30 MIN: CPT | Performed by: STUDENT IN AN ORGANIZED HEALTH CARE EDUCATION/TRAINING PROGRAM

## 2024-06-07 RX ORDER — SERDEXMETHYLPHENIDATE AND DEXMETHYLPHENIDATE 7.8; 39.2 MG/1; MG/1
CAPSULE ORAL
COMMUNITY
Start: 2024-05-28

## 2024-06-07 RX ORDER — FLUVOXAMINE MALEATE 150 MG/1
CAPSULE, EXTENDED RELEASE ORAL
COMMUNITY
Start: 2024-04-18

## 2024-06-07 RX ORDER — PROPRANOLOL HYDROCHLORIDE 20 MG/1
TABLET ORAL
COMMUNITY
Start: 2024-04-10

## 2024-07-16 ENCOUNTER — OFFICE VISIT (OUTPATIENT)
Dept: OBGYN CLINIC | Age: 34
End: 2024-07-16
Payer: COMMERCIAL

## 2024-07-16 VITALS
HEIGHT: 66 IN | DIASTOLIC BLOOD PRESSURE: 68 MMHG | SYSTOLIC BLOOD PRESSURE: 128 MMHG | WEIGHT: 263 LBS | BODY MASS INDEX: 42.27 KG/M2

## 2024-07-16 DIAGNOSIS — N93.9 ABNORMAL UTERINE BLEEDING (AUB): Primary | ICD-10-CM

## 2024-07-16 LAB
ESTRADIOL SERPL-MCNC: 36.5 PG/ML
FSH SERPL-ACNC: 6.9 MIU/ML
LH SERPL-ACNC: 4.4 MIU/ML
PROLACTIN SERPL-MCNC: 8.8 NG/ML (ref 4.8–23.3)

## 2024-07-16 PROCEDURE — 99214 OFFICE O/P EST MOD 30 MIN: CPT | Performed by: STUDENT IN AN ORGANIZED HEALTH CARE EDUCATION/TRAINING PROGRAM

## 2024-07-16 NOTE — PROGRESS NOTES
Bel Stephen (: 1990) is a 33 y.o. , Established patient, here for evaluation of the following chief complaint(s):    No chief complaint on file.      HPI:  Pt reports irregular periods.    ASSESSMENT/PLAN:  1. Abnormal uterine bleeding (AUB)  Overview:  24: reports cycles vary in length from 28-40 days for the past several years. For the last year they have been every 28 days but recently her last 2 cycles have lasted for 40 days.   TVUS 2023 did not show polycystic ovaries.  Will obtain AUB labwork.  Nexplanon placed for contraception.  Orders:  -     17-OH Progesterone, LC/MS; Future  -     Estradiol; Future  -     Follicle Stimulating Hormone; Future  -     Luteinizing Hormone; Future  -     Testosterone, free, total; Future  -     Prolactin; Future  -     TSH; Future       No follow-ups on file.    OBJECTIVE:  /68   Ht 1.676 m (5' 6\")   Wt 119.3 kg (263 lb)   LMP 2024 (Exact Date)   BMI 42.45 kg/m²      Past Medical History:   Diagnosis Date    Anxiety and depression     Biliary dyskinesia     Bipolar disorder, current episode depressed, moderate (HCC)     BMI 36.0-36.9,adult     Chronic post-traumatic stress disorder (PTSD)     Erythrocytosis 2017    Essential hypertension     no current treatment; \"white coat syndrome\"    Fatty liver     GDM (gestational diabetes mellitus)  &     no problems after pregnancy    Hyperlipidemia     no treatment    Left ventricular systolic dysfunction without heart failure 2017    Echo (17)=Ejection fraction was estimated in the range of 40 % to 45 %.  Left ventricle: The ventricle was moderately dilated. Systolic function was mildly reduced.    Marijuana use     2 times per week    OCD (obsessive compulsive disorder)     Vitamin D deficiency       Current Outpatient Medications   Medication Sig Dispense Refill    AZSTARYS 39.2-7.8 MG CAPS TAKE 1 CAPSULE BY MOUTH DAILY IN THE MORNING      propranolol (INDERAL)

## 2024-07-17 LAB — TSH, 3RD GENERATION: 0.69 UIU/ML (ref 0.27–4.2)

## 2024-07-18 PROBLEM — E28.2 PCOS (POLYCYSTIC OVARIAN SYNDROME): Status: ACTIVE | Noted: 2024-07-18

## 2024-07-18 LAB
TESTOST FREE SERPL-MCNC: 4.7 PG/ML (ref 0–4.2)
TESTOST SERPL-MCNC: 34 NG/DL (ref 8–60)

## 2024-07-20 LAB — 17OHP SERPL-MCNC: 15 NG/DL

## 2025-07-28 NOTE — PROGRESS NOTES
Bel Stephen (: 1990) is a 35 y.o. , Established patient, here for evaluation of the following chief complaint(s): Pelvic       HPI  Abdominal and pelvic fullness/pain on the right side that started about a month ago.   Pt states the pain/fullness is not consistent.   Pt had Nexplanon placed for contraception on 24.   Pt had LEFT OOPHORECTOMY LAPAROSCOPIC on 10/13/23.      ASSESSMENT/PLAN:  1. Pelvic pain  Overview:  25: reports right-sided pelvic pain and fullness that started about a month ago. It feels similar to when she had her prior cyst. She also has constipation, working with GI. Colonoscopy scheduled 25. Pain is intermittent, occurs every couple days, occurs randomly. She has Nexplanon in place for contraception since 2024 and does not have a period.  Pt had left oophorectomy 10/13/23 for ovarian cyst.  Plan:  Obtain TVUS  Recommend Miralax for constipation       Return in about 2 weeks (around 2025) for GYN visit with US.    OBJECTIVE:  /66   Ht 1.676 m (5' 6\")   Wt 121.7 kg (268 lb 4.8 oz)   LMP  (LMP Unknown)   BMI 43.30 kg/m²      Past Medical History:   Diagnosis Date    Anxiety and depression     Biliary dyskinesia     Bipolar disorder, current episode depressed, moderate (HCC)     BMI 36.0-36.9,adult     Chronic post-traumatic stress disorder (PTSD)     Erythrocytosis 2017    Essential hypertension     no current treatment; \"white coat syndrome\"    Fatty liver     GDM (gestational diabetes mellitus)  &     no problems after pregnancy    Hyperlipidemia     no treatment    Left ventricular systolic dysfunction without heart failure 2017    Echo (17)=Ejection fraction was estimated in the range of 40 % to 45 %.  Left ventricle: The ventricle was moderately dilated. Systolic function was mildly reduced.    Marijuana use     2 times per week    OCD (obsessive compulsive disorder)     PCOS (polycystic ovarian syndrome) 2024

## 2025-07-30 ENCOUNTER — OFFICE VISIT (OUTPATIENT)
Dept: OBGYN CLINIC | Age: 35
End: 2025-07-30
Payer: COMMERCIAL

## 2025-07-30 VITALS
HEIGHT: 66 IN | WEIGHT: 268.3 LBS | DIASTOLIC BLOOD PRESSURE: 66 MMHG | BODY MASS INDEX: 43.12 KG/M2 | SYSTOLIC BLOOD PRESSURE: 112 MMHG

## 2025-07-30 DIAGNOSIS — R10.2 PELVIC PAIN: Primary | ICD-10-CM

## 2025-07-30 PROCEDURE — 99213 OFFICE O/P EST LOW 20 MIN: CPT | Performed by: STUDENT IN AN ORGANIZED HEALTH CARE EDUCATION/TRAINING PROGRAM

## 2025-07-30 RX ORDER — ATORVASTATIN CALCIUM 20 MG/1
TABLET, FILM COATED ORAL
COMMUNITY
Start: 2025-07-24

## (undated) DEVICE — SUTURE MCRYL SZ 4-0 L27IN ABSRB UD L19MM PS-2 1/2 CIR PRIM Y426H

## (undated) DEVICE — INSUFFLATION NEEDLE TO ESTABLISH PNEUMOPERITONEUM.: Brand: INSUFFLATION NEEDLE

## (undated) DEVICE — CLIP INT M L POLYMER LOK LIG HEM O LOK

## (undated) DEVICE — NEEDLE INSUF L120MM ULT VERES ENDOPATH

## (undated) DEVICE — SOLUTION IRRIG 3000ML 0.9% SOD CHL USP UROMATIC PLAS CONT

## (undated) DEVICE — SUTURE SZ 0 27IN 5/8 CIR UR-6  TAPER PT VIOLET ABSRB VICRYL J603H

## (undated) DEVICE — KIT SUTURING DEVICE M-CLOSE

## (undated) DEVICE — INJECTOR UTERINE MANIPULATOR

## (undated) DEVICE — GYN LAPAROSCOPY: Brand: MEDLINE INDUSTRIES, INC.

## (undated) DEVICE — PAD PT POS 36 IN SURGYPAD DISP

## (undated) DEVICE — GLOVE SURG SZ 75 L12IN FNGR THK79MIL GRN LTX FREE

## (undated) DEVICE — SUCTION IRRIGATOR: Brand: ENDOWRIST

## (undated) DEVICE — TOTAL TRAY, DB, 100% SILI FOLEY, 16FR 10: Brand: MEDLINE

## (undated) DEVICE — GENERAL LAPAROSCOPY: Brand: MEDLINE INDUSTRIES, INC.

## (undated) DEVICE — TISSUE RETRIEVAL SYSTEM: Brand: INZII RETRIEVAL SYSTEM

## (undated) DEVICE — BLADE CLIPPER GEN PURP NS

## (undated) DEVICE — COVER MPLR TIP CRV SCIS ACC DA VINCI

## (undated) DEVICE — MASTISOL ADHESIVE LIQ 2/3ML

## (undated) DEVICE — SEALER TISS L37CM SHFT DIA5MM 360DEG ROT CVD JAW TAPR TIP

## (undated) DEVICE — SOLUTION ANTIFOG VIS SYS CLEARIFY LAPSCP

## (undated) DEVICE — 3M™ TEGADERM™ TRANSPARENT FILM DRESSING FRAME STYLE, 1624W, 2-3/8 IN X 2-3/4 IN (6 CM X 7 CM), 100/CT 4CT/CASE: Brand: 3M™ TEGADERM™

## (undated) DEVICE — STRIP,CLOSURE,WOUND,MEDI-STRIP,1/2X4: Brand: MEDLINE

## (undated) DEVICE — COLUMN DRAPE

## (undated) DEVICE — TROCAR: Brand: KII® SLEEVE

## (undated) DEVICE — GLOVE SURG SZ 65 THK91MIL LTX FREE SYN POLYISOPRENE

## (undated) DEVICE — APPLIER CLP M/L SHFT DIA5MM 15 LIG LIGAMAX 5

## (undated) DEVICE — GLOVE SURG SZ 7 L12IN FNGR THK79MIL GRN LTX FREE

## (undated) DEVICE — SOLUTION IRRIG 1000ML 0.9% SOD CHL USP POUR PLAS BTL

## (undated) DEVICE — CANNULA SEAL

## (undated) DEVICE — BLUNT OBTURATOR

## (undated) DEVICE — [HIGH FLOW INSUFFLATOR,  DO NOT USE IF PACKAGE IS DAMAGED,  KEEP DRY,  KEEP AWAY FROM SUNLIGHT,  PROTECT FROM HEAT AND RADIOACTIVE SOURCES.]: Brand: PNEUMOSURE

## (undated) DEVICE — SEAL

## (undated) DEVICE — TRAY,URINE METER,100% SILICONE,16FR10ML: Brand: MEDLINE

## (undated) DEVICE — BAG SPEC REM 224ML W4XL6IN DIA10MM 1 HND GYN DISP ENDOPCH

## (undated) DEVICE — ARM DRAPE

## (undated) DEVICE — SUTURE VCRL SZ 4-0 L27IN ABSRB UD L19MM PS-2 3/8 CIR PRIM J426H

## (undated) DEVICE — COVER,TABLE,44X76,STERILE: Brand: MEDLINE

## (undated) DEVICE — GAUZE,SPONGE,2"X2",8PLY,STERILE,LF,2'S: Brand: MEDLINE

## (undated) DEVICE — TROCAR: Brand: KII FIOS FIRST ENTRY

## (undated) DEVICE — REDUCER: Brand: ENDOWRIST

## (undated) DEVICE — TROCAR: Brand: KII® OPTICAL ACCESS SYSTEM

## (undated) DEVICE — INTENDED FOR TISSUE SEPARATION, AND OTHER PROCEDURES THAT REQUIRE A SHARP SURGICAL BLADE TO PUNCTURE OR CUT.: Brand: BARD-PARKER ® STAINLESS STEEL BLADES

## (undated) DEVICE — ELECTRO LUBE IS A SINGLE PATIENT USE DEVICE THAT IS INTENDED TO BE USED ON ELECTROSURGICAL ELECTRODES TO REDUCE STICKING.: Brand: KEY SURGICAL ELECTRO LUBE

## (undated) DEVICE — APPLICATOR MEDICATED 26 CC SOLUTION HI LT ORNG CHLORAPREP

## (undated) DEVICE — TUBING INSUFFLATION SMK EVAC HI FLO SET PNEUMOCLEAR